# Patient Record
Sex: FEMALE | Race: WHITE | NOT HISPANIC OR LATINO | Employment: OTHER | RURAL
[De-identification: names, ages, dates, MRNs, and addresses within clinical notes are randomized per-mention and may not be internally consistent; named-entity substitution may affect disease eponyms.]

---

## 2020-08-19 ENCOUNTER — HISTORICAL (OUTPATIENT)
Dept: ADMINISTRATIVE | Facility: HOSPITAL | Age: 75
End: 2020-08-19

## 2020-10-13 ENCOUNTER — HISTORICAL (OUTPATIENT)
Dept: ADMINISTRATIVE | Facility: HOSPITAL | Age: 75
End: 2020-10-13

## 2021-03-06 DIAGNOSIS — Z12.31 SCREENING MAMMOGRAM FOR HIGH-RISK PATIENT: Primary | ICD-10-CM

## 2021-03-18 RX ORDER — PANTOPRAZOLE SODIUM 40 MG/1
40 TABLET, DELAYED RELEASE ORAL DAILY
COMMUNITY
Start: 2021-01-04 | End: 2021-11-08 | Stop reason: SDUPTHER

## 2021-03-18 RX ORDER — AMLODIPINE BESYLATE 10 MG/1
10 TABLET ORAL NIGHTLY
COMMUNITY
Start: 2020-12-23 | End: 2021-03-18 | Stop reason: SDUPTHER

## 2021-03-18 RX ORDER — PROPRANOLOL HYDROCHLORIDE 20 MG/1
20 TABLET ORAL 2 TIMES DAILY
COMMUNITY
Start: 2021-02-22 | End: 2021-05-24 | Stop reason: SDUPTHER

## 2021-03-18 RX ORDER — AMLODIPINE BESYLATE 10 MG/1
10 TABLET ORAL NIGHTLY
Qty: 90 TABLET | Refills: 3 | Status: SHIPPED | OUTPATIENT
Start: 2021-03-18 | End: 2022-04-04 | Stop reason: SDUPTHER

## 2021-03-18 RX ORDER — TRIAMTERENE/HYDROCHLOROTHIAZID 37.5-25 MG
1 TABLET ORAL DAILY PRN
COMMUNITY
End: 2021-04-20 | Stop reason: SDUPTHER

## 2021-04-20 RX ORDER — TRIAMTERENE/HYDROCHLOROTHIAZID 37.5-25 MG
1 TABLET ORAL DAILY PRN
Qty: 90 TABLET | Refills: 3 | Status: SHIPPED | OUTPATIENT
Start: 2021-04-20 | End: 2022-06-15 | Stop reason: SDUPTHER

## 2021-04-26 ENCOUNTER — OFFICE VISIT (OUTPATIENT)
Dept: FAMILY MEDICINE | Facility: CLINIC | Age: 76
End: 2021-04-26
Payer: MEDICARE

## 2021-04-26 VITALS
BODY MASS INDEX: 24.66 KG/M2 | DIASTOLIC BLOOD PRESSURE: 70 MMHG | OXYGEN SATURATION: 97 % | WEIGHT: 134 LBS | RESPIRATION RATE: 18 BRPM | TEMPERATURE: 97 F | HEART RATE: 68 BPM | HEIGHT: 62 IN | SYSTOLIC BLOOD PRESSURE: 132 MMHG

## 2021-04-26 DIAGNOSIS — M54.50 RIGHT-SIDED LOW BACK PAIN WITHOUT SCIATICA, UNSPECIFIED CHRONICITY: Primary | ICD-10-CM

## 2021-04-26 DIAGNOSIS — M62.830 BACK MUSCLE SPASM: ICD-10-CM

## 2021-04-26 DIAGNOSIS — Z78.0 OSTEOPENIA AFTER MENOPAUSE: ICD-10-CM

## 2021-04-26 DIAGNOSIS — N95.8 OTHER SPECIFIED MENOPAUSAL AND PERIMENOPAUSAL DISORDERS: ICD-10-CM

## 2021-04-26 DIAGNOSIS — M85.80 OSTEOPENIA AFTER MENOPAUSE: ICD-10-CM

## 2021-04-26 PROCEDURE — 99213 PR OFFICE/OUTPT VISIT, EST, LEVL III, 20-29 MIN: ICD-10-PCS | Mod: 25,,, | Performed by: FAMILY MEDICINE

## 2021-04-26 PROCEDURE — 96372 THER/PROPH/DIAG INJ SC/IM: CPT | Mod: ,,, | Performed by: FAMILY MEDICINE

## 2021-04-26 PROCEDURE — 96372 PR INJECTION,THERAP/PROPH/DIAG2ST, IM OR SUBCUT: ICD-10-PCS | Mod: ,,, | Performed by: FAMILY MEDICINE

## 2021-04-26 PROCEDURE — 99213 OFFICE O/P EST LOW 20 MIN: CPT | Mod: 25,,, | Performed by: FAMILY MEDICINE

## 2021-04-26 RX ORDER — VIT C/E/ZN/COPPR/LUTEIN/ZEAXAN 250MG-90MG
CAPSULE ORAL
COMMUNITY

## 2021-04-26 RX ORDER — METHYLPREDNISOLONE ACETATE 80 MG/ML
80 INJECTION, SUSPENSION INTRA-ARTICULAR; INTRALESIONAL; INTRAMUSCULAR; SOFT TISSUE ONCE
Status: COMPLETED | OUTPATIENT
Start: 2021-04-26 | End: 2021-04-26

## 2021-04-26 RX ORDER — CYCLOBENZAPRINE HCL 10 MG
10 TABLET ORAL 3 TIMES DAILY PRN
Qty: 270 TABLET | Refills: 3 | Status: SHIPPED | OUTPATIENT
Start: 2021-04-26 | End: 2021-05-06

## 2021-04-26 RX ORDER — CHOLECALCIFEROL (VITAMIN D3) 125 MCG
5000 CAPSULE ORAL DAILY
COMMUNITY

## 2021-04-26 RX ORDER — B-COMPLEX WITH VITAMIN C
1 TABLET ORAL DAILY
COMMUNITY

## 2021-04-26 RX ADMIN — METHYLPREDNISOLONE ACETATE 80 MG: 80 INJECTION, SUSPENSION INTRA-ARTICULAR; INTRALESIONAL; INTRAMUSCULAR; SOFT TISSUE at 09:04

## 2021-05-10 ENCOUNTER — HOSPITAL ENCOUNTER (OUTPATIENT)
Dept: RADIOLOGY | Facility: HOSPITAL | Age: 76
Discharge: HOME | End: 2021-05-10
Attending: FAMILY MEDICINE
Payer: MEDICARE

## 2021-05-10 DIAGNOSIS — M85.80 OSTEOPENIA AFTER MENOPAUSE: ICD-10-CM

## 2021-05-10 DIAGNOSIS — N95.8 OTHER SPECIFIED MENOPAUSAL AND PERIMENOPAUSAL DISORDERS: ICD-10-CM

## 2021-05-10 DIAGNOSIS — Z78.0 OSTEOPENIA AFTER MENOPAUSE: ICD-10-CM

## 2021-05-10 PROCEDURE — 77080 DXA BONE DENSITY AXIAL: CPT | Mod: 26,,, | Performed by: RADIOLOGY

## 2021-05-10 PROCEDURE — 77080 DXA BONE DENSITY AXIAL: CPT | Mod: TC

## 2021-05-10 PROCEDURE — 77080 DEXA BONE DENSITY SPINE HIP: ICD-10-PCS | Mod: 26,,, | Performed by: RADIOLOGY

## 2021-05-24 DIAGNOSIS — R25.1 TREMOR: Primary | ICD-10-CM

## 2021-05-24 RX ORDER — PROPRANOLOL HYDROCHLORIDE 20 MG/1
20 TABLET ORAL 2 TIMES DAILY
Qty: 60 TABLET | Refills: 5 | Status: SHIPPED | OUTPATIENT
Start: 2021-05-24 | End: 2021-05-25 | Stop reason: SDUPTHER

## 2021-05-25 DIAGNOSIS — R25.1 TREMOR: ICD-10-CM

## 2021-05-25 RX ORDER — PROPRANOLOL HYDROCHLORIDE 20 MG/1
20 TABLET ORAL 2 TIMES DAILY
Qty: 180 TABLET | Refills: 2 | Status: SHIPPED | OUTPATIENT
Start: 2021-05-25 | End: 2021-11-03

## 2021-07-20 ENCOUNTER — OFFICE VISIT (OUTPATIENT)
Dept: GASTROENTEROLOGY | Facility: CLINIC | Age: 76
End: 2021-07-20
Payer: MEDICARE

## 2021-07-20 VITALS
SYSTOLIC BLOOD PRESSURE: 128 MMHG | WEIGHT: 132 LBS | HEIGHT: 61 IN | DIASTOLIC BLOOD PRESSURE: 67 MMHG | OXYGEN SATURATION: 97 % | BODY MASS INDEX: 24.92 KG/M2 | HEART RATE: 79 BPM

## 2021-07-20 DIAGNOSIS — R11.2 NAUSEA AND VOMITING, INTRACTABILITY OF VOMITING NOT SPECIFIED, UNSPECIFIED VOMITING TYPE: ICD-10-CM

## 2021-07-20 DIAGNOSIS — Z11.59 SPECIAL SCREENING EXAMINATION FOR VIRAL DISEASE: Primary | ICD-10-CM

## 2021-07-20 DIAGNOSIS — R10.13 EPIGASTRIC DISCOMFORT: Primary | ICD-10-CM

## 2021-07-20 DIAGNOSIS — R19.7 DIARRHEA, UNSPECIFIED TYPE: ICD-10-CM

## 2021-07-20 PROCEDURE — 99214 PR OFFICE/OUTPT VISIT, EST, LEVL IV, 30-39 MIN: ICD-10-PCS | Mod: ,,, | Performed by: NURSE PRACTITIONER

## 2021-07-20 PROCEDURE — 99214 OFFICE O/P EST MOD 30 MIN: CPT | Mod: ,,, | Performed by: NURSE PRACTITIONER

## 2021-07-22 ENCOUNTER — TELEPHONE (OUTPATIENT)
Dept: GASTROENTEROLOGY | Facility: CLINIC | Age: 76
End: 2021-07-22

## 2021-08-02 ENCOUNTER — TELEPHONE (OUTPATIENT)
Dept: GASTROENTEROLOGY | Facility: CLINIC | Age: 76
End: 2021-08-02

## 2021-08-12 ENCOUNTER — OFFICE VISIT (OUTPATIENT)
Dept: FAMILY MEDICINE | Facility: CLINIC | Age: 76
End: 2021-08-12
Payer: MEDICARE

## 2021-08-12 VITALS
TEMPERATURE: 98 F | HEART RATE: 78 BPM | OXYGEN SATURATION: 98 % | RESPIRATION RATE: 18 BRPM | WEIGHT: 130 LBS | SYSTOLIC BLOOD PRESSURE: 134 MMHG | DIASTOLIC BLOOD PRESSURE: 74 MMHG | BODY MASS INDEX: 24.55 KG/M2 | HEIGHT: 61 IN

## 2021-08-12 DIAGNOSIS — M54.41 CHRONIC MIDLINE LOW BACK PAIN WITH RIGHT-SIDED SCIATICA: Primary | ICD-10-CM

## 2021-08-12 DIAGNOSIS — D72.829 LEUKOCYTOSIS, UNSPECIFIED TYPE: ICD-10-CM

## 2021-08-12 DIAGNOSIS — G89.29 CHRONIC MIDLINE LOW BACK PAIN WITH RIGHT-SIDED SCIATICA: Primary | ICD-10-CM

## 2021-08-12 DIAGNOSIS — R35.0 FREQUENT URINATION: ICD-10-CM

## 2021-08-12 DIAGNOSIS — M54.9 DORSALGIA, UNSPECIFIED: ICD-10-CM

## 2021-08-12 DIAGNOSIS — N32.9 BLADDER PROBLEM: ICD-10-CM

## 2021-08-12 LAB
BILIRUB UR QL STRIP: NEGATIVE
CLARITY UR: ABNORMAL
COLOR UR: YELLOW
GLUCOSE UR STRIP-MCNC: NEGATIVE MG/DL
KETONES UR STRIP-SCNC: NEGATIVE MG/DL
LEUKOCYTE ESTERASE UR QL STRIP: NEGATIVE
NITRITE UR QL STRIP: NEGATIVE
PH UR STRIP: 5.5 PH UNITS
PROT UR QL STRIP: NEGATIVE
RBC # UR STRIP: NEGATIVE /UL
SP GR UR STRIP: 1.02
UROBILINOGEN UR STRIP-ACNC: 0.2 MG/DL

## 2021-08-12 PROCEDURE — 87186 SC STD MICRODIL/AGAR DIL: CPT | Mod: ,,, | Performed by: CLINICAL MEDICAL LABORATORY

## 2021-08-12 PROCEDURE — 87086 CULTURE, URINE: ICD-10-PCS | Mod: ,,, | Performed by: CLINICAL MEDICAL LABORATORY

## 2021-08-12 PROCEDURE — 87077 CULTURE, URINE: ICD-10-PCS | Mod: ,,, | Performed by: CLINICAL MEDICAL LABORATORY

## 2021-08-12 PROCEDURE — 87077 CULTURE AEROBIC IDENTIFY: CPT | Mod: ,,, | Performed by: CLINICAL MEDICAL LABORATORY

## 2021-08-12 PROCEDURE — 81003 URINALYSIS AUTO W/O SCOPE: CPT | Mod: QW,,, | Performed by: CLINICAL MEDICAL LABORATORY

## 2021-08-12 PROCEDURE — 99214 OFFICE O/P EST MOD 30 MIN: CPT | Mod: ,,, | Performed by: FAMILY MEDICINE

## 2021-08-12 PROCEDURE — 99214 PR OFFICE/OUTPT VISIT, EST, LEVL IV, 30-39 MIN: ICD-10-PCS | Mod: ,,, | Performed by: FAMILY MEDICINE

## 2021-08-12 PROCEDURE — 87086 URINE CULTURE/COLONY COUNT: CPT | Mod: ,,, | Performed by: CLINICAL MEDICAL LABORATORY

## 2021-08-12 PROCEDURE — 87186 CULTURE, URINE: ICD-10-PCS | Mod: ,,, | Performed by: CLINICAL MEDICAL LABORATORY

## 2021-08-12 PROCEDURE — 81003 URINALYSIS: ICD-10-PCS | Mod: QW,,, | Performed by: CLINICAL MEDICAL LABORATORY

## 2021-08-14 LAB — UA COMPLETE W REFLEX CULTURE PNL UR: ABNORMAL

## 2021-08-16 DIAGNOSIS — N30.00 ACUTE CYSTITIS WITHOUT HEMATURIA: Primary | ICD-10-CM

## 2021-08-16 RX ORDER — CIPROFLOXACIN 250 MG/1
250 TABLET, FILM COATED ORAL 2 TIMES DAILY
Qty: 14 TABLET | Refills: 0 | Status: SHIPPED | OUTPATIENT
Start: 2021-08-16 | End: 2021-08-23

## 2021-08-18 ENCOUNTER — HOSPITAL ENCOUNTER (OUTPATIENT)
Dept: RADIOLOGY | Facility: HOSPITAL | Age: 76
Discharge: HOME | End: 2021-08-18
Attending: FAMILY MEDICINE
Payer: MEDICARE

## 2021-08-18 DIAGNOSIS — G89.29 CHRONIC MIDLINE LOW BACK PAIN WITH RIGHT-SIDED SCIATICA: ICD-10-CM

## 2021-08-18 DIAGNOSIS — M54.9 DORSALGIA, UNSPECIFIED: ICD-10-CM

## 2021-08-18 DIAGNOSIS — M54.41 CHRONIC MIDLINE LOW BACK PAIN WITH RIGHT-SIDED SCIATICA: ICD-10-CM

## 2021-08-18 PROCEDURE — 72148 MRI LUMBAR SPINE W/O DYE: CPT | Mod: 26,,, | Performed by: STUDENT IN AN ORGANIZED HEALTH CARE EDUCATION/TRAINING PROGRAM

## 2021-08-18 PROCEDURE — 72148 MRI LUMBAR SPINE WITHOUT CONTRAST: ICD-10-PCS | Mod: 26,,, | Performed by: STUDENT IN AN ORGANIZED HEALTH CARE EDUCATION/TRAINING PROGRAM

## 2021-08-18 PROCEDURE — 72148 MRI LUMBAR SPINE W/O DYE: CPT | Mod: TC

## 2021-08-19 ENCOUNTER — HOSPITAL ENCOUNTER (OUTPATIENT)
Dept: RADIOLOGY | Facility: HOSPITAL | Age: 76
Discharge: HOME | End: 2021-08-19
Attending: NURSE PRACTITIONER
Payer: MEDICARE

## 2021-08-19 ENCOUNTER — OFFICE VISIT (OUTPATIENT)
Dept: SPINE | Facility: CLINIC | Age: 76
End: 2021-08-19
Payer: MEDICARE

## 2021-08-19 VITALS — HEIGHT: 61 IN | BODY MASS INDEX: 24.73 KG/M2 | WEIGHT: 131 LBS

## 2021-08-19 DIAGNOSIS — M54.41 CHRONIC MIDLINE LOW BACK PAIN WITH RIGHT-SIDED SCIATICA: ICD-10-CM

## 2021-08-19 DIAGNOSIS — G89.29 CHRONIC MIDLINE LOW BACK PAIN WITH RIGHT-SIDED SCIATICA: ICD-10-CM

## 2021-08-19 DIAGNOSIS — M43.16 SPONDYLOLISTHESIS OF LUMBAR REGION: Primary | ICD-10-CM

## 2021-08-19 DIAGNOSIS — M54.9 DORSALGIA, UNSPECIFIED: ICD-10-CM

## 2021-08-19 PROCEDURE — 72110 XR LUMBAR SPINE 5 VIEW WITH FLEX AND EXT: ICD-10-PCS | Mod: 26,,, | Performed by: RADIOLOGY

## 2021-08-19 PROCEDURE — 99214 PR OFFICE/OUTPT VISIT, EST, LEVL IV, 30-39 MIN: ICD-10-PCS | Mod: S$PBB,ICN,, | Performed by: NURSE PRACTITIONER

## 2021-08-19 PROCEDURE — 72110 X-RAY EXAM L-2 SPINE 4/>VWS: CPT | Mod: 26,,, | Performed by: RADIOLOGY

## 2021-08-19 PROCEDURE — 72110 X-RAY EXAM L-2 SPINE 4/>VWS: CPT | Mod: TC

## 2021-08-19 PROCEDURE — 99214 OFFICE O/P EST MOD 30 MIN: CPT | Mod: S$PBB,ICN,, | Performed by: NURSE PRACTITIONER

## 2021-08-19 PROCEDURE — 99214 OFFICE O/P EST MOD 30 MIN: CPT | Mod: PBBFAC | Performed by: NURSE PRACTITIONER

## 2021-08-19 RX ORDER — GABAPENTIN 300 MG/1
300 CAPSULE ORAL 3 TIMES DAILY
Qty: 90 CAPSULE | Refills: 11 | Status: SHIPPED | OUTPATIENT
Start: 2021-08-19 | End: 2022-12-01

## 2021-08-24 ENCOUNTER — TELEPHONE (OUTPATIENT)
Dept: FAMILY MEDICINE | Facility: CLINIC | Age: 76
End: 2021-08-24

## 2021-08-24 DIAGNOSIS — N30.00 ACUTE CYSTITIS WITHOUT HEMATURIA: Primary | ICD-10-CM

## 2021-08-24 RX ORDER — CEFDINIR 300 MG/1
300 CAPSULE ORAL 2 TIMES DAILY
Qty: 14 CAPSULE | Refills: 0 | Status: SHIPPED | OUTPATIENT
Start: 2021-08-24 | End: 2021-08-31

## 2021-09-01 ENCOUNTER — ANESTHESIA EVENT (OUTPATIENT)
Dept: GASTROENTEROLOGY | Facility: HOSPITAL | Age: 76
End: 2021-09-01
Payer: MEDICARE

## 2021-09-01 ENCOUNTER — HOSPITAL ENCOUNTER (OUTPATIENT)
Dept: GASTROENTEROLOGY | Facility: HOSPITAL | Age: 76
Discharge: HOME | End: 2021-09-01
Attending: NURSE PRACTITIONER
Payer: MEDICARE

## 2021-09-01 ENCOUNTER — ANESTHESIA (OUTPATIENT)
Dept: GASTROENTEROLOGY | Facility: HOSPITAL | Age: 76
End: 2021-09-01
Payer: MEDICARE

## 2021-09-01 VITALS
DIASTOLIC BLOOD PRESSURE: 60 MMHG | TEMPERATURE: 97 F | WEIGHT: 132 LBS | SYSTOLIC BLOOD PRESSURE: 147 MMHG | BODY MASS INDEX: 24.92 KG/M2 | RESPIRATION RATE: 13 BRPM | OXYGEN SATURATION: 99 % | HEIGHT: 61 IN | HEART RATE: 76 BPM

## 2021-09-01 DIAGNOSIS — R11.2 NAUSEA AND VOMITING, INTRACTABILITY OF VOMITING NOT SPECIFIED, UNSPECIFIED VOMITING TYPE: ICD-10-CM

## 2021-09-01 DIAGNOSIS — K44.9 HH (HIATUS HERNIA): ICD-10-CM

## 2021-09-01 DIAGNOSIS — K29.00 ACUTE SUPERFICIAL GASTRITIS WITHOUT HEMORRHAGE: ICD-10-CM

## 2021-09-01 PROCEDURE — 88305 SURGICAL PATHOLOGY: ICD-10-PCS | Mod: 26,,, | Performed by: PATHOLOGY

## 2021-09-01 PROCEDURE — 27201423 OPTIME MED/SURG SUP & DEVICES STERILE SUPPLY

## 2021-09-01 PROCEDURE — 63600175 PHARM REV CODE 636 W HCPCS: Performed by: NURSE ANESTHETIST, CERTIFIED REGISTERED

## 2021-09-01 PROCEDURE — 88342 IMHCHEM/IMCYTCHM 1ST ANTB: CPT | Mod: 26,,, | Performed by: PATHOLOGY

## 2021-09-01 PROCEDURE — 43239 EGD BIOPSY SINGLE/MULTIPLE: CPT

## 2021-09-01 PROCEDURE — C1889 IMPLANT/INSERT DEVICE, NOC: HCPCS

## 2021-09-01 PROCEDURE — 88305 TISSUE EXAM BY PATHOLOGIST: CPT | Mod: 26,,, | Performed by: PATHOLOGY

## 2021-09-01 PROCEDURE — 37000008 HC ANESTHESIA 1ST 15 MINUTES

## 2021-09-01 PROCEDURE — 88342 SURGICAL PATHOLOGY: ICD-10-PCS | Mod: 26,,, | Performed by: PATHOLOGY

## 2021-09-01 PROCEDURE — D9220A PRA ANESTHESIA: ICD-10-PCS | Mod: ,,, | Performed by: NURSE ANESTHETIST, CERTIFIED REGISTERED

## 2021-09-01 PROCEDURE — 88305 TISSUE EXAM BY PATHOLOGIST: CPT | Mod: SUR | Performed by: INTERNAL MEDICINE

## 2021-09-01 PROCEDURE — D9220A PRA ANESTHESIA: Mod: ,,, | Performed by: NURSE ANESTHETIST, CERTIFIED REGISTERED

## 2021-09-01 PROCEDURE — 25000003 PHARM REV CODE 250: Performed by: NURSE ANESTHETIST, CERTIFIED REGISTERED

## 2021-09-01 RX ORDER — PROPOFOL 10 MG/ML
INJECTION, EMULSION INTRAVENOUS
Status: DISCONTINUED | OUTPATIENT
Start: 2021-09-01 | End: 2021-09-01

## 2021-09-01 RX ORDER — SODIUM CHLORIDE 0.9 % (FLUSH) 0.9 %
10 SYRINGE (ML) INJECTION
Status: DISCONTINUED | OUTPATIENT
Start: 2021-09-01 | End: 2021-09-02 | Stop reason: HOSPADM

## 2021-09-01 RX ORDER — LIDOCAINE HYDROCHLORIDE 20 MG/ML
INJECTION, SOLUTION EPIDURAL; INFILTRATION; INTRACAUDAL; PERINEURAL
Status: DISCONTINUED | OUTPATIENT
Start: 2021-09-01 | End: 2021-09-01

## 2021-09-01 RX ADMIN — SODIUM CHLORIDE: 9 INJECTION, SOLUTION INTRAVENOUS at 09:09

## 2021-09-01 RX ADMIN — PROPOFOL 80 MG: 10 INJECTION, EMULSION INTRAVENOUS at 09:09

## 2021-09-01 RX ADMIN — LIDOCAINE HYDROCHLORIDE 60 MG: 20 INJECTION, SOLUTION INTRAVENOUS at 09:09

## 2021-09-01 RX ADMIN — PROPOFOL 30 MG: 10 INJECTION, EMULSION INTRAVENOUS at 09:09

## 2021-09-02 LAB
DHEA SERPL-MCNC: NORMAL
ESTROGEN SERPL-MCNC: NORMAL PG/ML
LAB AP GROSS DESCRIPTION: NORMAL
LAB AP LABORATORY NOTES: NORMAL
T3RU NFR SERPL: NORMAL %

## 2021-09-14 ENCOUNTER — TELEPHONE (OUTPATIENT)
Dept: GASTROENTEROLOGY | Facility: CLINIC | Age: 76
End: 2021-09-14

## 2021-09-15 ENCOUNTER — OFFICE VISIT (OUTPATIENT)
Dept: GASTROENTEROLOGY | Facility: CLINIC | Age: 76
End: 2021-09-15
Payer: MEDICARE

## 2021-09-15 VITALS
DIASTOLIC BLOOD PRESSURE: 68 MMHG | HEIGHT: 61 IN | SYSTOLIC BLOOD PRESSURE: 140 MMHG | BODY MASS INDEX: 25.49 KG/M2 | HEART RATE: 86 BPM | WEIGHT: 135 LBS | OXYGEN SATURATION: 98 %

## 2021-09-15 DIAGNOSIS — R10.13 EPIGASTRIC DISCOMFORT: ICD-10-CM

## 2021-09-15 DIAGNOSIS — R11.2 NAUSEA AND VOMITING, INTRACTABILITY OF VOMITING NOT SPECIFIED, UNSPECIFIED VOMITING TYPE: Primary | ICD-10-CM

## 2021-09-15 DIAGNOSIS — R19.7 DIARRHEA, UNSPECIFIED TYPE: ICD-10-CM

## 2021-09-15 DIAGNOSIS — K44.9 HH (HIATUS HERNIA): ICD-10-CM

## 2021-09-15 PROCEDURE — 99214 PR OFFICE/OUTPT VISIT, EST, LEVL IV, 30-39 MIN: ICD-10-PCS | Mod: ,,, | Performed by: NURSE PRACTITIONER

## 2021-09-15 PROCEDURE — 99214 OFFICE O/P EST MOD 30 MIN: CPT | Mod: ,,, | Performed by: NURSE PRACTITIONER

## 2021-09-20 ENCOUNTER — HOSPITAL ENCOUNTER (OUTPATIENT)
Dept: RADIOLOGY | Facility: HOSPITAL | Age: 76
Discharge: HOME | End: 2021-09-20
Attending: NURSE PRACTITIONER
Payer: MEDICARE

## 2021-09-20 ENCOUNTER — TELEPHONE (OUTPATIENT)
Dept: GASTROENTEROLOGY | Facility: CLINIC | Age: 76
End: 2021-09-20

## 2021-09-20 DIAGNOSIS — R11.2 NAUSEA AND VOMITING, INTRACTABILITY OF VOMITING NOT SPECIFIED, UNSPECIFIED VOMITING TYPE: ICD-10-CM

## 2021-09-20 PROCEDURE — 78264 GASTRIC EMPTYING IMG STUDY: CPT | Mod: TC

## 2021-09-20 PROCEDURE — 78264 NM GASTRIC EMPTYING: ICD-10-PCS | Mod: 26,,, | Performed by: RADIOLOGY

## 2021-09-20 PROCEDURE — 78264 GASTRIC EMPTYING IMG STUDY: CPT | Mod: 26,,, | Performed by: RADIOLOGY

## 2021-09-21 ENCOUNTER — TELEPHONE (OUTPATIENT)
Dept: GASTROENTEROLOGY | Facility: CLINIC | Age: 76
End: 2021-09-21

## 2021-11-01 ENCOUNTER — OFFICE VISIT (OUTPATIENT)
Dept: SPINE | Facility: CLINIC | Age: 76
End: 2021-11-01
Payer: MEDICARE

## 2021-11-01 DIAGNOSIS — M43.16 SPONDYLOLISTHESIS OF LUMBAR REGION: Primary | ICD-10-CM

## 2021-11-01 DIAGNOSIS — M54.16 LUMBAR RADICULOPATHY: ICD-10-CM

## 2021-11-01 PROCEDURE — 99213 OFFICE O/P EST LOW 20 MIN: CPT | Mod: PBBFAC | Performed by: ORTHOPAEDIC SURGERY

## 2021-11-01 PROCEDURE — 99214 OFFICE O/P EST MOD 30 MIN: CPT | Mod: S$PBB,,, | Performed by: ORTHOPAEDIC SURGERY

## 2021-11-01 PROCEDURE — 99214 PR OFFICE/OUTPT VISIT, EST, LEVL IV, 30-39 MIN: ICD-10-PCS | Mod: S$PBB,,, | Performed by: ORTHOPAEDIC SURGERY

## 2021-11-03 ENCOUNTER — OFFICE VISIT (OUTPATIENT)
Dept: NEUROLOGY | Facility: CLINIC | Age: 76
End: 2021-11-03
Payer: MEDICARE

## 2021-11-03 VITALS
HEART RATE: 103 BPM | SYSTOLIC BLOOD PRESSURE: 136 MMHG | OXYGEN SATURATION: 99 % | DIASTOLIC BLOOD PRESSURE: 68 MMHG | WEIGHT: 135 LBS | RESPIRATION RATE: 18 BRPM | HEIGHT: 61 IN | BODY MASS INDEX: 25.49 KG/M2

## 2021-11-03 DIAGNOSIS — G25.0 BENIGN HEAD TREMOR: Primary | ICD-10-CM

## 2021-11-03 PROCEDURE — 99213 PR OFFICE/OUTPT VISIT, EST, LEVL III, 20-29 MIN: ICD-10-PCS | Mod: S$PBB,,, | Performed by: NURSE PRACTITIONER

## 2021-11-03 PROCEDURE — 99214 OFFICE O/P EST MOD 30 MIN: CPT | Mod: PBBFAC | Performed by: NURSE PRACTITIONER

## 2021-11-03 PROCEDURE — 99213 OFFICE O/P EST LOW 20 MIN: CPT | Mod: S$PBB,,, | Performed by: NURSE PRACTITIONER

## 2021-11-03 RX ORDER — PROPRANOLOL HYDROCHLORIDE 40 MG/1
40 TABLET ORAL 2 TIMES DAILY
Qty: 60 TABLET | Refills: 11 | Status: SHIPPED | OUTPATIENT
Start: 2021-11-03 | End: 2022-12-01 | Stop reason: SDUPTHER

## 2021-11-03 RX ORDER — PROPRANOLOL HYDROCHLORIDE 40 MG/1
40 TABLET ORAL 3 TIMES DAILY
Qty: 90 TABLET | Refills: 11 | Status: SHIPPED | OUTPATIENT
Start: 2021-11-03 | End: 2021-11-03

## 2021-11-08 ENCOUNTER — HOSPITAL ENCOUNTER (OUTPATIENT)
Dept: RADIOLOGY | Facility: HOSPITAL | Age: 76
Discharge: HOME | End: 2021-11-08
Payer: MEDICARE

## 2021-11-08 VITALS — HEIGHT: 61 IN | BODY MASS INDEX: 25.49 KG/M2 | WEIGHT: 135 LBS

## 2021-11-08 DIAGNOSIS — Z12.31 SCREENING MAMMOGRAM FOR HIGH-RISK PATIENT: ICD-10-CM

## 2021-11-08 PROCEDURE — 77067 SCR MAMMO BI INCL CAD: CPT | Mod: TC

## 2021-11-08 RX ORDER — PANTOPRAZOLE SODIUM 40 MG/1
40 TABLET, DELAYED RELEASE ORAL DAILY
Qty: 30 TABLET | Refills: 6 | Status: SHIPPED | OUTPATIENT
Start: 2021-11-08 | End: 2022-07-05 | Stop reason: SDUPTHER

## 2021-11-10 ENCOUNTER — OFFICE VISIT (OUTPATIENT)
Dept: GASTROENTEROLOGY | Facility: CLINIC | Age: 76
End: 2021-11-10
Payer: MEDICARE

## 2021-11-10 VITALS
WEIGHT: 134 LBS | SYSTOLIC BLOOD PRESSURE: 139 MMHG | HEART RATE: 75 BPM | BODY MASS INDEX: 25.3 KG/M2 | OXYGEN SATURATION: 99 % | DIASTOLIC BLOOD PRESSURE: 67 MMHG | HEIGHT: 61 IN

## 2021-11-10 DIAGNOSIS — K31.84 GASTROPARESIS: ICD-10-CM

## 2021-11-10 DIAGNOSIS — K44.9 HH (HIATUS HERNIA): ICD-10-CM

## 2021-11-10 DIAGNOSIS — R10.13 EPIGASTRIC DISCOMFORT: Primary | ICD-10-CM

## 2021-11-10 PROCEDURE — 99213 OFFICE O/P EST LOW 20 MIN: CPT | Mod: ,,, | Performed by: NURSE PRACTITIONER

## 2021-11-10 PROCEDURE — 99213 PR OFFICE/OUTPT VISIT, EST, LEVL III, 20-29 MIN: ICD-10-PCS | Mod: ,,, | Performed by: NURSE PRACTITIONER

## 2022-01-30 ENCOUNTER — OFFICE VISIT (OUTPATIENT)
Dept: FAMILY MEDICINE | Facility: CLINIC | Age: 77
End: 2022-01-30
Payer: MEDICARE

## 2022-01-30 VITALS
HEART RATE: 90 BPM | OXYGEN SATURATION: 99 % | BODY MASS INDEX: 25.3 KG/M2 | DIASTOLIC BLOOD PRESSURE: 86 MMHG | TEMPERATURE: 98 F | WEIGHT: 134 LBS | HEIGHT: 61 IN | SYSTOLIC BLOOD PRESSURE: 155 MMHG

## 2022-01-30 DIAGNOSIS — Z20.828 CONTACT WITH AND (SUSPECTED) EXPOSURE TO OTHER VIRAL COMMUNICABLE DISEASES: Primary | ICD-10-CM

## 2022-01-30 DIAGNOSIS — U07.1 CLINICAL DIAGNOSIS OF COVID-19: ICD-10-CM

## 2022-01-30 LAB
CTP QC/QA: YES
SARS-COV-2 AG RESP QL IA.RAPID: POSITIVE

## 2022-01-30 PROCEDURE — 87426 SARSCOV CORONAVIRUS AG IA: CPT | Mod: RHCUB | Performed by: NURSE PRACTITIONER

## 2022-01-30 PROCEDURE — 99213 PR OFFICE/OUTPT VISIT, EST, LEVL III, 20-29 MIN: ICD-10-PCS | Mod: CR,,, | Performed by: NURSE PRACTITIONER

## 2022-01-30 PROCEDURE — 99213 OFFICE O/P EST LOW 20 MIN: CPT | Mod: CR,,, | Performed by: NURSE PRACTITIONER

## 2022-01-30 RX ORDER — ONDANSETRON 4 MG/1
4 TABLET, ORALLY DISINTEGRATING ORAL EVERY 8 HOURS PRN
Qty: 30 TABLET | Refills: 0 | Status: SHIPPED | OUTPATIENT
Start: 2022-01-30 | End: 2022-02-07 | Stop reason: SDUPTHER

## 2022-01-30 NOTE — PROGRESS NOTES
RAYMUNDO Jiménez   Lefor BENY SWEENEY [address] [place] [address]  [phone]      PATIENT NAME: Masoud Major  : 1945  DATE: 22  MRN: [id number]      Billing Provider: RAYMUNDO Jiménez  Level of Service: MT OFFICE/OUTPT VISIT, EST, LEVL III, 20-29 MIN  Patient PCP Information     Provider PCP Type    Charley Holm MD General          Reason for Visit / Chief Complaint: COVID-19 Concerns, Cough, Sore Throat, and Nausea       Update PCP  Update Chief Complaint         History of Present Illness / Problem Focused Workflow     Patient presents to clinic with the above listed complaints. She is COVID positive today. States her  tested positive 4 and received IV antibodies yesterday. She declined IV antibodies at this time and states she will chantale back if she changes her mind.     Review of Systems     Review of Systems   Constitutional: Negative for chills, diaphoresis, fatigue and fever.   HENT: Positive for sore throat. Negative for congestion, ear pain, facial swelling and trouble swallowing.    Eyes: Negative for pain, discharge, redness, itching and visual disturbance.   Respiratory: Positive for cough. Negative for apnea, chest tightness, shortness of breath and wheezing.    Cardiovascular: Negative for chest pain, palpitations and leg swelling.   Gastrointestinal: Positive for nausea. Negative for abdominal pain, constipation, diarrhea and vomiting.   Genitourinary: Negative for dysuria.   Skin: Negative for rash.   Neurological: Negative for dizziness and headaches.       Medical / Social / Family History     Past Medical History:   Diagnosis Date    Abnormal weight loss     Acute bronchitis     Acute superficial gastritis without hemorrhage 2021    Altered bowel function     Anxiety     Back pain     Belching symptom     Benign hypertension     Cobalamin deficiency     Constipation      Contact dermatitis     Depression     Diarrhea     Disorder of muscle, ligament, and fascia     Dysphagia     Elevated white blood cell count     Encounter for screening colonoscopy 03/22/2016    Essential hypertension     Essential tremor     Excessive weight loss     Fracture of thoracic spine     Frequent urination     Ganglion, right wrist     GERD (gastroesophageal reflux disease)     HH (hiatus hernia) 9/1/2021    History of bone density study 10/26/2020    History of esophagogastroduodenoscopy (EGD) 06/23/2016    History of mammography, screening 03/29/2017    Hyperlipemia     Incomplete emptying of bladder     Insomnia     Multiple joint pain     Osteoarthritis     Osteopenia     Pain, lumbar region     Pap smear for cervical cancer screening 03/2010    Scoliosis     Stomach cramps     Subconjunctival hemorrhage     Vitamin D deficiency        Past Surgical History:   Procedure Laterality Date    ADENOIDECTOMY      BREAST BIOPSY      CERVIX LESION DESTRUCTION      CHOLECYSTECTOMY      HYSTERECTOMY      suspensiion of uterus  1965    TONSILLECTOMY      VERTEBROPLASTY  06/06/2013    Dr. alejandro       Social History  Ms.  reports that she has quit smoking. She has never used smokeless tobacco. She reports current alcohol use of about 1.0 standard drink of alcohol per week. She reports that she does not use drugs.    Family History  Ms.'s family history includes Breast cancer in her maternal aunt, maternal aunt, and sister; Cancer in her sister; Dementia in her mother; Diabetes in her mother; Heart disease in her mother; Hypertension in her mother.    Medications and Allergies     Medications  Outpatient Medications Marked as Taking for the 1/30/22 encounter (Office Visit) with RAYMUNDO Jiménez   Medication Sig Dispense Refill    B-complex with vitamin C (Z-BEC OR EQUIV) tablet Take 1 tablet by mouth once daily.      cholecalciferol, vitamin D3, 125 mcg (5,000 unit)  capsule Take 5,000 Units by mouth once daily.      gabapentin (NEURONTIN) 300 MG capsule Take 1 capsule (300 mg total) by mouth 3 (three) times daily. (Patient taking differently: Take 100 mg by mouth 3 (three) times daily.) 90 capsule 11    pantoprazole (PROTONIX) 40 MG tablet Take 1 tablet (40 mg total) by mouth once daily. 30 tablet 6    propranoloL (INDERAL) 40 MG tablet Take 1 tablet (40 mg total) by mouth 2 (two) times daily. 60 tablet 11    vit C,B-Cy-mrxrx-lutein-zeaxan (PRESERVISION AREDS-2) 250-90-40-1 mg Cap Take by mouth.         Allergies  Review of patient's allergies indicates:   Allergen Reactions    Augmentin [amoxicillin-pot clavulanate]     Macrobid [nitrofurantoin monohyd/m-cryst]     Sulfa (sulfonamide antibiotics)        Physical Examination   There were no vitals filed for this visit.  Physical Exam  Constitutional:       General: She is not in acute distress.     Appearance: She is ill-appearing.   HENT:      Head: Normocephalic.      Right Ear: External ear normal.      Left Ear: External ear normal.      Nose: Nose normal.   Eyes:      Conjunctiva/sclera: Conjunctivae normal.      Pupils: Pupils are equal, round, and reactive to light.   Cardiovascular:      Rate and Rhythm: Normal rate and regular rhythm.      Pulses: Normal pulses.      Heart sounds: Normal heart sounds.   Pulmonary:      Effort: Pulmonary effort is normal.      Breath sounds: Normal breath sounds.   Neurological:      Mental Status: She is alert.         Assessment and Plan (including Health Maintenance)      Problem List  Smart Sets  Document Outside HM   :    Health Maintenance Due   Topic Date Due    Hepatitis C Screening  Never done    COVID-19 Vaccine (1) Never done    Shingles Vaccine (1 of 2) Never done    Influenza Vaccine (1) 09/01/2021       Problem List Items Addressed This Visit    None     Visit Diagnoses     Contact with and (suspected) exposure to other viral communicable diseases    -  Primary     Relevant Orders    SARS Coronavirus 2 Antigen, POCT          Health Maintenance Topics with due status: Not Due       Topic Last Completion Date    TETANUS VACCINE 01/30/2015    Lipid Panel 07/30/2020    DEXA SCAN 05/10/2021    Colorectal Cancer Screening 07/23/2021    Mammogram 11/08/2021       Future Appointments   Date Time Provider Department Center   5/2/2022  8:45 AM Lalo Hay MD [facility]   5/10/2022  9:00 AM RAYMUNDO Umaña [facility]   11/14/2022  1:30 PM [facility]          Signature:  RAYMUNDO JiménezH BENY SWEENEY [facility] - [address] [place] [address]  [phone]    Date of encounter: 1/30/22

## 2022-02-07 ENCOUNTER — OFFICE VISIT (OUTPATIENT)
Dept: FAMILY MEDICINE | Facility: CLINIC | Age: 77
End: 2022-02-07
Payer: MEDICARE

## 2022-02-07 DIAGNOSIS — J01.40 ACUTE NON-RECURRENT PANSINUSITIS: ICD-10-CM

## 2022-02-07 DIAGNOSIS — U07.1 COVID-19: ICD-10-CM

## 2022-02-07 DIAGNOSIS — R11.2 NAUSEA AND VOMITING, INTRACTABILITY OF VOMITING NOT SPECIFIED, UNSPECIFIED VOMITING TYPE: Primary | ICD-10-CM

## 2022-02-07 DIAGNOSIS — J02.9 SORE THROAT: ICD-10-CM

## 2022-02-07 PROCEDURE — 99213 PR OFFICE/OUTPT VISIT, EST, LEVL III, 20-29 MIN: ICD-10-PCS | Mod: CR,,, | Performed by: FAMILY MEDICINE

## 2022-02-07 PROCEDURE — 99213 OFFICE O/P EST LOW 20 MIN: CPT | Mod: CR,,, | Performed by: FAMILY MEDICINE

## 2022-02-07 RX ORDER — AZITHROMYCIN 250 MG/1
TABLET, FILM COATED ORAL
Qty: 6 TABLET | Refills: 0 | Status: SHIPPED | OUTPATIENT
Start: 2022-02-07 | End: 2022-02-12

## 2022-02-07 RX ORDER — METHYLPREDNISOLONE 4 MG/1
TABLET ORAL
Qty: 1 EACH | Refills: 0 | Status: SHIPPED | OUTPATIENT
Start: 2022-02-07 | End: 2022-02-28

## 2022-02-07 RX ORDER — ONDANSETRON 4 MG/1
4 TABLET, ORALLY DISINTEGRATING ORAL EVERY 8 HOURS PRN
Qty: 30 TABLET | Refills: 0 | Status: SHIPPED | OUTPATIENT
Start: 2022-02-07 | End: 2022-03-09

## 2022-02-07 RX ORDER — IPRATROPIUM BROMIDE 42 UG/1
2 SPRAY, METERED NASAL 4 TIMES DAILY
Qty: 15 ML | Refills: 1 | Status: SHIPPED | OUTPATIENT
Start: 2022-02-07

## 2022-02-07 NOTE — ASSESSMENT & PLAN NOTE
Patient has a persistent positive COVID test so will go ahead and treat her with a Medrol Dosepak and  Zithromax for her sinus infection.  Will also give her some Atrovent nasal spray to open her sinuses.  Follow-up on a p.r.n. basis or in 1 week.  She will need to continue to isolate since she is COVID positive for at least 5 more days.

## 2022-02-07 NOTE — PROGRESS NOTES
Jero Dallas DO   [address]  [place]  [address]      PATIENT NAME: Masoud Major  : 1945  DATE: 22  MRN: [id number]      Billing Provider: Jero Dallas DO  Level of Service:   Patient PCP Information     Provider PCP Type    Charley Holm MD General          Reason for Visit / Chief Complaint: Sore Throat (Patient complains sore throat x 2 days), Nausea (Patient complains nausea on off x 1 day), and Sinus Problem (Patient complains of a lot of yellow sinus drainage x 2 days)       Update PCP  Update Chief Complaint         History of Present Illness / Problem Focused Workflow     Masoud Major presents to the clinic with Sore Throat (Patient complains sore throat x 2 days), Nausea (Patient complains nausea on off x 1 day), and Sinus Problem (Patient complains of a lot of yellow sinus drainage x 2 days)     Patient had a recent positive COVID test around the  of the is month and is still positive today.  She reports sinus pressure and pain with congestion in her chest.  She denies any diarrhea but does have some nausea with vomiting.  No fever today.  Does have yellow-greenish discharge from her nose.  No PND orthopnea or swelling of her legs.      Review of Systems     Review of Systems   Constitutional: Negative for activity change, appetite change, chills, fatigue and fever.   HENT: Positive for nasal congestion, postnasal drip and sinus pressure/congestion. Negative for ear discharge, ear pain, mouth dryness, mouth sores, sore throat and voice change.    Eyes: Negative for pain, discharge, redness, itching and visual disturbance.   Respiratory: Positive for cough. Negative for apnea, chest tightness, shortness of breath and wheezing.    Cardiovascular: Negative for chest pain, palpitations and leg swelling.   Gastrointestinal: Negative for abdominal distention, abdominal pain, anal bleeding, blood in stool, change in bowel habit, constipation,  diarrhea, nausea, vomiting, reflux and change in bowel habit.   Endocrine: Negative for cold intolerance, heat intolerance, polydipsia, polyphagia and polyuria.   Genitourinary: Negative for difficulty urinating, enuresis, erectile dysfunction, frequency, genital sores, hematuria, hot flashes, menstrual irregularity, urgency and vaginal dryness.   Musculoskeletal: Negative for arthralgias, back pain, gait problem, leg pain, myalgias and neck pain.   Integumentary:  Negative for rash, mole/lesion, breast mass, breast discharge and breast tenderness.   Allergic/Immunologic: Negative for environmental allergies and food allergies.   Neurological: Negative for dizziness, vertigo, tremors, seizures, syncope, facial asymmetry, speech difficulty, weakness, light-headedness, numbness, headaches, disturbances in coordination, memory loss and coordination difficulties.   Hematological: Negative for adenopathy. Does not bruise/bleed easily.   Psychiatric/Behavioral: Negative for agitation, behavioral problems, confusion, decreased concentration, dysphoric mood, hallucinations, self-injury, sleep disturbance and suicidal ideas. The patient is not nervous/anxious and is not hyperactive.    Breast: Negative for mass and tenderness      Medical / Social / Family History     Past Medical History:   Diagnosis Date    Abnormal weight loss     Acute bronchitis     Acute superficial gastritis without hemorrhage 9/1/2021    Altered bowel function     Anxiety     Back pain     Belching symptom     Benign hypertension     Cobalamin deficiency     Constipation     Contact dermatitis     Depression     Diarrhea     Disorder of muscle, ligament, and fascia     Dysphagia     Elevated white blood cell count     Encounter for screening colonoscopy 03/22/2016    Essential hypertension     Essential tremor     Excessive weight loss     Fracture of thoracic spine     Frequent urination     Ganglion, right wrist     GERD  (gastroesophageal reflux disease)     HH (hiatus hernia) 9/1/2021    History of bone density study 10/26/2020    History of esophagogastroduodenoscopy (EGD) 06/23/2016    History of mammography, screening 03/29/2017    Hyperlipemia     Incomplete emptying of bladder     Insomnia     Multiple joint pain     Osteoarthritis     Osteopenia     Pain, lumbar region     Pap smear for cervical cancer screening 03/2010    Scoliosis     Stomach cramps     Subconjunctival hemorrhage     Vitamin D deficiency        Past Surgical History:   Procedure Laterality Date    ADENOIDECTOMY      BREAST BIOPSY      CERVIX LESION DESTRUCTION      CHOLECYSTECTOMY      HYSTERECTOMY      suspensiion of uterus  1965    TONSILLECTOMY      VERTEBROPLASTY  06/06/2013    Dr. alejandro       Social History  Ms.  reports that she has quit smoking. She has never used smokeless tobacco. She reports current alcohol use of about 1.0 standard drink of alcohol per week. She reports that she does not use drugs.    Family History  Ms.'s family history includes Breast cancer in her maternal aunt, maternal aunt, and sister; Cancer in her sister; Dementia in her mother; Diabetes in her mother; Heart disease in her mother; Hypertension in her mother.    Medications and Allergies     Medications  Outpatient Medications Marked as Taking for the 2/7/22 encounter (Office Visit) with Jero Dallas, DO   Medication Sig Dispense Refill    amLODIPine (NORVASC) 10 MG tablet Take 1 tablet (10 mg total) by mouth every evening. 90 tablet 3    B-complex with vitamin C (Z-BEC OR EQUIV) tablet Take 1 tablet by mouth once daily.      cholecalciferol, vitamin D3, 125 mcg (5,000 unit) capsule Take 5,000 Units by mouth once daily.      pantoprazole (PROTONIX) 40 MG tablet Take 1 tablet (40 mg total) by mouth once daily. 30 tablet 6    propranoloL (INDERAL) 40 MG tablet Take 1 tablet (40 mg total) by mouth 2 (two) times daily. 60 tablet 11    vit  C,W-Hh-qmhxn-lutein-zeaxan (PRESERVISION AREDS-2) 250-90-40-1 mg Cap Take by mouth.      [DISCONTINUED] ondansetron (ZOFRAN-ODT) 4 MG TbDL Take 1 tablet (4 mg total) by mouth every 8 (eight) hours as needed (nausea). 30 tablet 0       Allergies  Review of patient's allergies indicates:   Allergen Reactions    Augmentin [amoxicillin-pot clavulanate]     Macrobid [nitrofurantoin monohyd/m-cryst]     Sulfa (sulfonamide antibiotics)        Physical Examination   There were no vitals filed for this visit.  Physical Exam  Vitals reviewed.   Constitutional:       General: She is not in acute distress.     Appearance: Normal appearance. She is normal weight.   HENT:      Head: Normocephalic and atraumatic.      Right Ear: Tympanic membrane normal.      Left Ear: Tympanic membrane normal.      Nose: Congestion present. No rhinorrhea.      Comments: Right septal deviation.     Mouth/Throat:      Mouth: Mucous membranes are moist.      Pharynx: Oropharynx is clear. No oropharyngeal exudate or posterior oropharyngeal erythema.   Eyes:      General: No scleral icterus.     Extraocular Movements: Extraocular movements intact.      Conjunctiva/sclera: Conjunctivae normal.      Pupils: Pupils are equal, round, and reactive to light.   Neck:      Vascular: No carotid bruit.   Cardiovascular:      Rate and Rhythm: Normal rate and regular rhythm.      Pulses: Normal pulses.      Heart sounds: Normal heart sounds. No murmur heard.  No friction rub. No gallop.    Pulmonary:      Effort: Pulmonary effort is normal. No respiratory distress.      Breath sounds: Normal breath sounds. No wheezing.   Abdominal:      General: Abdomen is flat. Bowel sounds are normal. There is no distension.      Palpations: Abdomen is soft.      Tenderness: There is no abdominal tenderness.   Musculoskeletal:         General: Normal range of motion.      Cervical back: Normal range of motion and neck supple.      Right lower leg: No edema.      Left lower  leg: No edema.   Lymphadenopathy:      Cervical: No cervical adenopathy.   Skin:     General: Skin is warm and dry.      Capillary Refill: Capillary refill takes less than 2 seconds.      Coloration: Skin is not jaundiced.      Findings: No lesion.   Neurological:      General: No focal deficit present.      Mental Status: She is alert and oriented to person, place, and time. Mental status is at baseline.      Cranial Nerves: No cranial nerve deficit.      Sensory: No sensory deficit.      Motor: No weakness.      Coordination: Coordination normal.      Gait: Gait normal.      Deep Tendon Reflexes: Reflexes normal.   Psychiatric:         Mood and Affect: Mood normal.         Behavior: Behavior normal.         Thought Content: Thought content normal.         Judgment: Judgment normal.               Lab Results   Component Value Date    WBC 12.61 (H) 07/20/2021    HGB 13.3 07/20/2021    HCT 37.6 (L) 07/20/2021    MCV 88.7 07/20/2021     (H) 07/20/2021          Sodium   Date Value Ref Range Status   07/20/2021 137 136 - 145 mmol/L Final     Potassium   Date Value Ref Range Status   07/20/2021 3.8 3.5 - 5.1 mmol/L Final     Chloride   Date Value Ref Range Status   07/20/2021 102 98 - 107 mmol/L Final     CO2   Date Value Ref Range Status   07/20/2021 29 21 - 32 mmol/L Final     Glucose   Date Value Ref Range Status   07/20/2021 95 74 - 106 mg/dL Final     BUN   Date Value Ref Range Status   07/20/2021 18 7 - 18 mg/dL Final     Creatinine   Date Value Ref Range Status   07/20/2021 0.94 0.55 - 1.02 mg/dL Final     Calcium   Date Value Ref Range Status   07/20/2021 9.3 8.5 - 10.1 mg/dL Final     Total Protein   Date Value Ref Range Status   07/20/2021 7.4 6.4 - 8.2 g/dL Final     Albumin   Date Value Ref Range Status   07/20/2021 3.9 3.5 - 5.0 g/dL Final     Bilirubin, Total   Date Value Ref Range Status   07/20/2021 0.3 >0.0 - 1.2 mg/dL Final     Alk Phos   Date Value Ref Range Status   07/20/2021 103 55 - 142 U/L  Final     AST   Date Value Ref Range Status   07/20/2021 24 15 - 37 U/L Final     ALT   Date Value Ref Range Status   07/20/2021 33 13 - 56 U/L Final     Anion Gap   Date Value Ref Range Status   07/20/2021 10 7 - 16 mmol/L Final     eGFR   Date Value Ref Range Status   07/20/2021 62 >=60 mL/min/1.73m² Final      Mammo Digital Screening Bilat  Narrative: Result:   Mammo Digital Screening Bilat     History:  Patient is 76 y.o. and is seen for a screening mammogram.    Positive family history of breast cancer.    Films Compared:  Compared to: 10/13/2020 Mammo Digital Screening Bilat (No Change)     Findings:  The breasts are almost entirely fatty. There is no evidence of suspicious   masses, calcifications, or other abnormal findings.    There are some occasional scattered benign-appearing calcifications   without change.  Impression: Bilateral  There is no mammographic evidence of malignancy.    BI-RADS Category:   Overall: 2 - Benign     Recommendation:  Routine screening mammogram in 1 year is recommended.    Your estimated lifetime risk of breast cancer (to age 85) based on   Tyrer-Cuzick risk assessment model is Tyrer-Cuzick: 5.96 %. According to   the American Cancer Society, patients with a lifetime breast cancer risk   of 20% or higher might benefit from supplemental screening tests.     Procedures   Assessment and Plan (including Health Maintenance)      Problem List  Smart Sets  Document Outside HM   :    Plan:         Health Maintenance Due   Topic Date Due    Hepatitis C Screening  Never done    COVID-19 Vaccine (1) Never done    Shingles Vaccine (1 of 2) Never done    Influenza Vaccine (1) 09/01/2021       Problem List Items Addressed This Visit        ENT    Acute non-recurrent pansinusitis    Current Assessment & Plan     Patient has a persistent positive COVID test so will go ahead and treat her with a Medrol Dosepak and  Zithromax for her sinus infection.  Will also give her some Atrovent nasal  spray to open her sinuses.  Follow-up on a p.r.n. basis or in 1 week.  She will need to continue to isolate since she is COVID positive for at least 5 more days.         Relevant Medications    methylPREDNISolone (MEDROL DOSEPACK) 4 mg tablet    azithromycin (Z-MONIKA) 250 MG tablet    ipratropium (ATROVENT) 42 mcg (0.06 %) nasal spray       GI    Nausea and vomiting - Primary    Relevant Medications    ondansetron (ZOFRAN-ODT) 4 MG TbDL       Other    COVID-19    Current Assessment & Plan     Continued isolate.  If symptoms worsen she is to follow back in the clinic.         Relevant Medications    methylPREDNISolone (MEDROL DOSEPACK) 4 mg tablet    azithromycin (Z-MONIKA) 250 MG tablet    ipratropium (ATROVENT) 42 mcg (0.06 %) nasal spray      Other Visit Diagnoses     Sore throat              Health Maintenance Topics with due status: Not Due       Topic Last Completion Date    TETANUS VACCINE 01/30/2015    Lipid Panel 07/30/2020    DEXA SCAN 05/10/2021    Colorectal Cancer Screening 07/23/2021    Mammogram 11/08/2021       Future Appointments   Date Time Provider Department Center   5/2/2022  8:45 AM Lalo Hay MD [facility]   5/10/2022  9:00 AM RAYMUNDO Umaña [facility]   11/14/2022  1:30 PM [facility]        Follow up in about 4 weeks (around 3/7/2022).     Signature:  Jero Dallas [place] [address] [place]  [address]    Date of encounter: 2/7/22

## 2022-03-11 DIAGNOSIS — Z71.89 COMPLEX CARE COORDINATION: ICD-10-CM

## 2022-04-04 ENCOUNTER — TELEPHONE (OUTPATIENT)
Dept: FAMILY MEDICINE | Facility: CLINIC | Age: 77
End: 2022-04-04
Payer: MEDICARE

## 2022-04-04 DIAGNOSIS — I10 PRIMARY HYPERTENSION: Primary | ICD-10-CM

## 2022-04-04 RX ORDER — AMLODIPINE BESYLATE 10 MG/1
10 TABLET ORAL NIGHTLY
Qty: 90 TABLET | Refills: 3 | Status: SHIPPED | OUTPATIENT
Start: 2022-04-04 | End: 2023-04-03 | Stop reason: SDUPTHER

## 2022-05-02 ENCOUNTER — OFFICE VISIT (OUTPATIENT)
Dept: SPINE | Facility: CLINIC | Age: 77
End: 2022-05-02
Payer: MEDICARE

## 2022-05-02 ENCOUNTER — HOSPITAL ENCOUNTER (OUTPATIENT)
Dept: RADIOLOGY | Facility: HOSPITAL | Age: 77
Discharge: HOME | End: 2022-05-02
Attending: ORTHOPAEDIC SURGERY
Payer: MEDICARE

## 2022-05-02 DIAGNOSIS — M51.36 DDD (DEGENERATIVE DISC DISEASE), LUMBAR: ICD-10-CM

## 2022-05-02 DIAGNOSIS — M54.16 LUMBAR RADICULOPATHY: Primary | ICD-10-CM

## 2022-05-02 DIAGNOSIS — M54.16 LUMBAR RADICULOPATHY: ICD-10-CM

## 2022-05-02 DIAGNOSIS — M43.16 SPONDYLOLISTHESIS OF LUMBAR REGION: ICD-10-CM

## 2022-05-02 DIAGNOSIS — S32.010A CLOSED COMPRESSION FRACTURE OF BODY OF L1 VERTEBRA: ICD-10-CM

## 2022-05-02 PROCEDURE — 99213 OFFICE O/P EST LOW 20 MIN: CPT | Mod: PBBFAC | Performed by: ORTHOPAEDIC SURGERY

## 2022-05-02 PROCEDURE — 72110 X-RAY EXAM L-2 SPINE 4/>VWS: CPT | Mod: 26,,, | Performed by: ORTHOPAEDIC SURGERY

## 2022-05-02 PROCEDURE — 72110 X-RAY EXAM L-2 SPINE 4/>VWS: CPT | Mod: TC

## 2022-05-02 PROCEDURE — 99214 OFFICE O/P EST MOD 30 MIN: CPT | Mod: S$PBB,,, | Performed by: ORTHOPAEDIC SURGERY

## 2022-05-02 PROCEDURE — 72110 XR LUMBAR SPINE 4-5 VIEW WITH BENDING VIEWS: ICD-10-PCS | Mod: 26,,, | Performed by: ORTHOPAEDIC SURGERY

## 2022-05-02 PROCEDURE — 99214 PR OFFICE/OUTPT VISIT, EST, LEVL IV, 30-39 MIN: ICD-10-PCS | Mod: S$PBB,,, | Performed by: ORTHOPAEDIC SURGERY

## 2022-05-02 NOTE — PROGRESS NOTES
AP, lateral, flexion/extension views of the lumbar spine reviewed    On the AP there is left thoracolumbar curvature.  There are 5 non-rib-bearing lumbar vertebrae.  There has been prior T11 kyphoplasty.  On the lateral there is decreased lumbar lordosis.  There is spondylotic disease with decreased disc height and osteophyte formation noted.  Grade 1 anterolisthesis at L3-4.  T12 compression deformity.    Impression:  Spondylotic changes of the lumbar spine as noted above

## 2022-05-03 NOTE — PROGRESS NOTES
MDM/time:  Greater than 30 minutes spent on this encounter including 10 minutes reviewing imaging and notes, 15 minutes with the patient, 5 minutes documentation    ASSESSMENT:  77 y.o. female with lumbar degenerative scoliosis, L1 compression fracture, prior T12 kyphoplasty, lumbar spondylolisthesis with radiculopathy.    PLAN:  Referral to [facility] for osteoporosis evaluation.  Continue treatment with total pain care.  Follow-up as needed    HPI:  77 y.o. female here for repeat evaluation of low back pain that radiates into the right hip.  Reports that PT has helped with her bilateral lower extremity strength.  Has had rhizotomy with some improvement of her pain.  Suffered a compression fracture treated with kyphoplasty Dr. Donald.  Patient reports surgery January 2021 they moved and she painted walls she feels that this caused an increased amount of pain in her back.  She has been seen by chiropractor in taking Tylenol for pain, with little to no pain relief.  Patient denies difficulty with balance no recent falls.  Denies bladder bowel incontinence.  No difficulty walking distances.  Currently taking Tylenol as needed for pain.  No prior spine surgery.  Patient is currently not a smoker, quit smoking December of 2019.      IMAGING:  X-rays lumbar spine reviewed show:  On the AP there is left thoracolumbar curvature.  There are 5 non-rib-bearing lumbar vertebrae.  There has been prior T11 kyphoplasty.  On the lateral there is decreased lumbar lordosis.  There is spondylotic disease with decreased disc height and osteophyte formation noted.  Grade 1 anterolisthesis at L3-4.  T12 compression deformity.    8/12/2021 Lumbar spine MRI reviewed showed:   T12-L1: Retrolisthesis, circumferential disc bulge, and facet arthrosis as well as scoliosis results in mild-to-moderate bilateral neural foramen narrowing and mild spinal canal narrowing.  L1-L2: Facet arthrosis results in mild bilateral neural foramen narrowing.  The  spinal canal is patent.  L2-L3: Facet arthrosis and scoliosis results in mild bilateral neural foramen narrowing.  The spinal canal is patent.  L3-L4: Ligamentum flavum buckling, circumferential disc bulge and facet arthrosis results in severe left and severe right neural foramen narrowing as well as severe spinal canal narrowing.  L4-L5: The osteophytic spurring and facet arthrosis results in severe left neural foramen narrowing to the right neural foramen is patent.  The spinal canal is patent.  L5-S1: Normal  Degenerative change of the spinous processes.    Past Medical History:   Diagnosis Date    Abnormal weight loss     Acute bronchitis     Acute superficial gastritis without hemorrhage 9/1/2021    Altered bowel function     Anxiety     Back pain     Belching symptom     Benign hypertension     Cobalamin deficiency     Constipation     Contact dermatitis     Depression     Diarrhea     Disorder of muscle, ligament, and fascia     Dysphagia     Elevated white blood cell count     Encounter for screening colonoscopy 03/22/2016    Essential hypertension     Essential tremor     Excessive weight loss     Fracture of thoracic spine     Frequent urination     Ganglion, right wrist     GERD (gastroesophageal reflux disease)     HH (hiatus hernia) 9/1/2021    History of bone density study 10/26/2020    History of esophagogastroduodenoscopy (EGD) 06/23/2016    History of mammography, screening 03/29/2017    Hyperlipemia     Incomplete emptying of bladder     Insomnia     Multiple joint pain     Osteoarthritis     Osteopenia     Pain, lumbar region     Pap smear for cervical cancer screening 03/2010    Scoliosis     Stomach cramps     Subconjunctival hemorrhage     Vitamin D deficiency      Past Surgical History:   Procedure Laterality Date    ADENOIDECTOMY      BREAST BIOPSY      CERVIX LESION DESTRUCTION      CHOLECYSTECTOMY      HYSTERECTOMY      suspensiion of uterus   1965    TONSILLECTOMY      VERTEBROPLASTY  06/06/2013    Dr. alejandro     Social History     Tobacco Use    Smoking status: Former Smoker    Smokeless tobacco: Never Used   Substance Use Topics    Alcohol use: Yes     Alcohol/week: 1.0 standard drink     Types: 1 Glasses of wine per week     Comment: social    Drug use: Never      Current Outpatient Medications   Medication Instructions    amLODIPine (NORVASC) 10 mg, Oral, Nightly    B-complex with vitamin C (Z-BEC OR EQUIV) tablet 1 tablet, Oral, Daily    cholecalciferol (vitamin D3) 5,000 Units, Oral, Daily    gabapentin (NEURONTIN) 300 mg, Oral, 3 times daily    ipratropium (ATROVENT) 42 mcg (0.06 %) nasal spray 2 sprays, Nasal, 4 times daily    pantoprazole (PROTONIX) 40 mg, Oral, Daily    propranoloL (INDERAL) 40 mg, Oral, 2 times daily    triamterene-hydrochlorothiazide 37.5-25 mg (MAXZIDE-25) 37.5-25 mg per tablet 1 tablet, Oral, Daily PRN, 1/2 to 1 po daily PRN edema     vit C,R-Cb-dxsem-lutein-zeaxan (PRESERVISION AREDS-2) 250-90-40-1 mg Cap Oral        EXAM:  Constitutional  General Appearance:  There is no height or weight on file to calculate BMI., NAD  Psychiatric   Orientation: Oriented to time, oriented to place, oriented to person  Mood and Affect: Active and alert, normal mood, normal affect  Gait and Station   Appearance:  Normal gait, normal tandem gait, able to walk on toes, able to walk on heels    LUMBAR  Musculoskeletal System   Hips: Normal appearance, no leg length discrepancy, normal motion; left, normal motion; right    Lumbar Spine                   Inspection:  Normal alignment, normal sagittal balance                  Range of motion: decreased flexion, extension, lateral bending, rotation. Pain with range of motion                  Bony Palpation of the Lumbar Spine:  No tenderness of the spinous process, no tenderness of the sacrum, no tenderness of the coccyx                  Bony Palpation of the Right Hip:  No tenderness  of the iliac crest, no tenderness of the sciatic notch, no tenderness of the SI joint                  Bony Palpation of the Left Hip:  No tenderness of the iliac crest, no tenderness of the sciatic notch, no tenderness of the SI joint                  Soft Tissue Palpation on the Right:  No tenderness of the paraspinal region, no tenderness of the iliolumbar region                  Soft Tissue Palpation on the Left:  No tenderness of the paraspinal region, no tenderness of the iliolumbar region    Motor Strength   L1 Right:  Hip flexion iliopsoas 5/5    L1 Left:  Hip flexion iliopsoas 5/5              L2-L4 Right:  Knee extension quadriceps 5/5, tibialis anterior 5/5              L2-L4 Left:  Knee extension quadriceps 5/5, tibialis anterior 5/5   L5 Right:  Extensor hallucis llongus 5/5,    L5 Left:  Extensor hallucis longus 5/5,    S1 Right:  Plantar flexion gastrocnemius 5/5   S1 Left:  Plantar flexion gastrocnemius 5/5    Neurological System   Ankle Reflex Right:  normal   Ankle Reflex Left: normal   Knee Reflex Right:  normal   Knee Reflex Left:  normal   Sensation on the Right:  L2 normal, L3 normal, L4 normal, L5 normal, S1 normal   Sensation on the Left:  L2 normal, L3 normal, L4 normal, L5 normal, S1 normal              Special Test on the Right:  Seated straight leg raising test negative, no clonus of the ankle              Special Test on the Left:  Seated straight leg raising test negative, no clonus of the ankle    Skin   Lumbosacral Spine:  Normal skin    Cardiovascular System   Arterial Pulses Right:  Posterior tibialis normal, dorsalis pedis normal   Arterial Pulses Left:  Posterior tibialis normal, dorsalis pedis normal   Edema Right: None   Edema Left:  None

## 2022-05-09 PROBLEM — J01.40 ACUTE NON-RECURRENT PANSINUSITIS: Status: RESOLVED | Noted: 2022-02-07 | Resolved: 2022-05-09

## 2022-05-10 ENCOUNTER — OFFICE VISIT (OUTPATIENT)
Dept: GASTROENTEROLOGY | Facility: CLINIC | Age: 77
End: 2022-05-10
Payer: MEDICARE

## 2022-05-10 VITALS
WEIGHT: 135.63 LBS | DIASTOLIC BLOOD PRESSURE: 63 MMHG | HEIGHT: 61 IN | BODY MASS INDEX: 25.61 KG/M2 | HEART RATE: 82 BPM | SYSTOLIC BLOOD PRESSURE: 126 MMHG | OXYGEN SATURATION: 98 %

## 2022-05-10 DIAGNOSIS — Z83.719 FAMILY HISTORY OF COLONIC POLYPS: ICD-10-CM

## 2022-05-10 DIAGNOSIS — Z11.59 SCREENING FOR VIRAL DISEASE: ICD-10-CM

## 2022-05-10 DIAGNOSIS — K44.9 HH (HIATUS HERNIA): ICD-10-CM

## 2022-05-10 DIAGNOSIS — K31.84 GASTROPARESIS: Primary | ICD-10-CM

## 2022-05-10 PROCEDURE — 99214 OFFICE O/P EST MOD 30 MIN: CPT | Mod: ,,, | Performed by: NURSE PRACTITIONER

## 2022-05-10 PROCEDURE — 99214 PR OFFICE/OUTPT VISIT, EST, LEVL IV, 30-39 MIN: ICD-10-PCS | Mod: ,,, | Performed by: NURSE PRACTITIONER

## 2022-05-10 RX ORDER — CYCLOBENZAPRINE HCL 10 MG
10 TABLET ORAL 3 TIMES DAILY PRN
COMMUNITY
Start: 2022-03-18

## 2022-05-10 NOTE — PROGRESS NOTES
Masoud Major is a 77 y.o. female here for Follow-up        PCP: Charley Holm  Referring Provider: No referring provider defined for this encounter.     HPI:  Presents for follow up gastroparesis and GERD. States she is doing well as long as she follows the gastroparesis diet. She did have a nausea and vomiting episode on Sunday after eating Mexican with a salad. Improved now. EGD 9/1/21 reviewed. Last colonoscopy 3/22/16, no polyps. Patient reports today that her father had precancerous polyps. No hematochezia or melena.         ROS:  Review of Systems   Constitutional: Negative for appetite change, fatigue, fever and unexpected weight change.   HENT: Negative for trouble swallowing.    Respiratory: Negative for shortness of breath and wheezing.    Cardiovascular: Negative for chest pain.   Gastrointestinal: Positive for nausea, vomiting and reflux. Negative for abdominal pain, blood in stool, change in bowel habit, constipation, diarrhea (improved), rectal pain, fecal incontinence and change in bowel habit.   Genitourinary: Negative for dysuria.   Musculoskeletal: Negative for gait problem.   Integumentary:  Negative for pallor.   Neurological: Negative for dizziness.   Psychiatric/Behavioral: The patient is not nervous/anxious.           PMHX:  has a past medical history of Abnormal weight loss, Acute bronchitis, Acute superficial gastritis without hemorrhage (9/1/2021), Altered bowel function, Anxiety, Back pain, Belching symptom, Benign hypertension, Cobalamin deficiency, Constipation, Contact dermatitis, Depression, Diarrhea, Disorder of muscle, ligament, and fascia, Dysphagia, Elevated white blood cell count, Encounter for screening colonoscopy (03/22/2016), Essential hypertension, Essential tremor, Excessive weight loss, Fracture of thoracic spine, Frequent urination, Ganglion, right wrist, GERD (gastroesophageal reflux disease), HH (hiatus hernia) (9/1/2021), History of bone density study  (10/26/2020), History of esophagogastroduodenoscopy (EGD) (06/23/2016), History of mammography, screening (03/29/2017), Hyperlipemia, Incomplete emptying of bladder, Insomnia, Multiple joint pain, Osteoarthritis, Osteopenia, Pain, lumbar region, Pap smear for cervical cancer screening (03/2010), Scoliosis, Stomach cramps, Subconjunctival hemorrhage, and Vitamin D deficiency.    PSHX:  has a past surgical history that includes Tonsillectomy; Adenoidectomy; suspensiion of uterus (1965); Hysterectomy; Cervix lesion destruction; Cholecystectomy; Vertebroplasty (06/06/2013); and Breast biopsy.    PFHX: family history includes Breast cancer in her maternal aunt, maternal aunt, and sister; Cancer in her sister; Dementia in her mother; Diabetes in her mother; Heart disease in her mother; Hypertension in her mother.    PSlHX:  reports that she has quit smoking. She has never used smokeless tobacco. She reports current alcohol use of about 1.0 standard drink of alcohol per week. She reports that she does not use drugs.        Review of patient's allergies indicates:   Allergen Reactions    Augmentin [amoxicillin-pot clavulanate]     Macrobid [nitrofurantoin monohyd/m-cryst]     Sulfa (sulfonamide antibiotics)        Medication List with Changes/Refills   Current Medications    AMLODIPINE (NORVASC) 10 MG TABLET    Take 1 tablet (10 mg total) by mouth every evening.    B-COMPLEX WITH VITAMIN C (Z-BEC OR EQUIV) TABLET    Take 1 tablet by mouth once daily.    CHOLECALCIFEROL, VITAMIN D3, 125 MCG (5,000 UNIT) CAPSULE    Take 5,000 Units by mouth once daily.    CYCLOBENZAPRINE (FLEXERIL) 10 MG TABLET    Take 10 mg by mouth 3 (three) times daily as needed.    GABAPENTIN (NEURONTIN) 300 MG CAPSULE    Take 1 capsule (300 mg total) by mouth 3 (three) times daily.    IPRATROPIUM (ATROVENT) 42 MCG (0.06 %) NASAL SPRAY    2 sprays by Nasal route 4 (four) times daily.    PANTOPRAZOLE (PROTONIX) 40 MG TABLET    Take 1 tablet (40 mg  "total) by mouth once daily.    PROPRANOLOL (INDERAL) 40 MG TABLET    Take 1 tablet (40 mg total) by mouth 2 (two) times daily.    TRIAMTERENE-HYDROCHLOROTHIAZIDE 37.5-25 MG (MAXZIDE-25) 37.5-25 MG PER TABLET    Take 1 tablet by mouth daily as needed (edema). 1/2 to 1 po daily PRN edema    VIT C,L-YM-MISHG-LUTEIN-ZEAXAN (PRESERVISION AREDS-2) 250-90-40-1 MG CAP    Take by mouth.        Objective Findings:  Vital Signs:  /63   Pulse 82   Ht 5' 1" (1.549 m)   Wt 61.5 kg (135 lb 9.6 oz)   SpO2 98%   BMI 25.62 kg/m²  Body mass index is 25.62 kg/m².    Physical Exam:  Physical Exam  Vitals and nursing note reviewed.   Constitutional:       General: She is not in acute distress.     Appearance: Normal appearance. She is not ill-appearing.   HENT:      Mouth/Throat:      Mouth: Mucous membranes are moist.   Eyes:      Extraocular Movements: Extraocular movements intact.   Cardiovascular:      Rate and Rhythm: Normal rate.      Heart sounds: No murmur heard.  Pulmonary:      Breath sounds: No wheezing, rhonchi or rales.   Abdominal:      General: Bowel sounds are normal. There is no distension.      Palpations: Abdomen is soft. There is no mass.      Tenderness: There is no abdominal tenderness. There is no guarding or rebound.      Hernia: No hernia is present.   Musculoskeletal:      Right lower leg: No edema.      Left lower leg: No edema.   Skin:     General: Skin is warm and dry.      Coloration: Skin is not jaundiced or pale.   Neurological:      Mental Status: She is alert and oriented to person, place, and time.   Psychiatric:         Mood and Affect: Mood normal.          Labs:  Lab Results   Component Value Date    WBC 12.61 (H) 07/20/2021    HGB 13.3 07/20/2021    HCT 37.6 (L) 07/20/2021    MCV 88.7 07/20/2021    RDW 11.9 07/20/2021     (H) 07/20/2021    LYMPH 25.3 (L) 07/20/2021    LYMPH 3.19 07/20/2021    MONO 6.7 (H) 07/20/2021    EOS 0.18 07/20/2021    BASO 0.09 07/20/2021     Lab Results "   Component Value Date     07/20/2021    K 3.8 07/20/2021     07/20/2021    CO2 29 07/20/2021    GLU 95 07/20/2021    BUN 18 07/20/2021    CREATININE 0.94 07/20/2021    CALCIUM 9.3 07/20/2021    PROT 7.4 07/20/2021    ALBUMIN 3.9 07/20/2021    BILITOT 0.3 07/20/2021    ALKPHOS 103 07/20/2021    AST 24 07/20/2021    ALT 33 07/20/2021         Imaging: X-Ray Lumbar 4-5 View including Bending Views    Result Date: 5/2/2022  See Procedure Notes for results. IMPRESSION: Please see Ortho procedure notes for report.  This procedure was auto-finalized by: Virtual Radiologist        Assessment:  Masoud Major is a 77 y.o. female here with:  1. Gastroparesis    2. HH (hiatus hernia)    3. Family history of colonic polyps    4. Screening for viral disease          Recommendations:  1. Low residue, small frequent meals. Avoid raw fruits and vegetables  2. Avoid spicy, greasy foods  Avoid caffeine, citric acid, chocolate, peppermint, and carbonated drinks  Do not lay down within 3 hours of eating  Exercise 150 minutes per week  Increase fluid to 64 ounces daily  Avoid antiinflammatory medications such as motrin, advil, aleve, ibuprofen, and BC powder  3. Schedule colonoscopy, family history of colon polyps in the patient's father  4. Start drinking Miralax early am the day before due to gastroparesis        No follow-ups on file.      Order summary:  Orders Placed This Encounter    POCT COVID-19 Rapid Screening    Colonoscopy       Thank you for allowing me to participate in the care of Masoud Major.      JAGDISH Aviles

## 2022-06-13 ENCOUNTER — HOSPITAL ENCOUNTER (OUTPATIENT)
Dept: GASTROENTEROLOGY | Facility: HOSPITAL | Age: 77
Discharge: HOME | End: 2022-06-13
Attending: NURSE PRACTITIONER
Payer: MEDICARE

## 2022-06-13 ENCOUNTER — ANESTHESIA EVENT (OUTPATIENT)
Dept: GASTROENTEROLOGY | Facility: HOSPITAL | Age: 77
End: 2022-06-13
Payer: MEDICARE

## 2022-06-13 ENCOUNTER — ANESTHESIA (OUTPATIENT)
Dept: GASTROENTEROLOGY | Facility: HOSPITAL | Age: 77
End: 2022-06-13
Payer: MEDICARE

## 2022-06-13 VITALS
DIASTOLIC BLOOD PRESSURE: 68 MMHG | WEIGHT: 130 LBS | SYSTOLIC BLOOD PRESSURE: 115 MMHG | RESPIRATION RATE: 17 BRPM | BODY MASS INDEX: 24.55 KG/M2 | HEIGHT: 61 IN | HEART RATE: 90 BPM | OXYGEN SATURATION: 97 % | TEMPERATURE: 98 F

## 2022-06-13 DIAGNOSIS — K57.30 COLON, DIVERTICULOSIS: ICD-10-CM

## 2022-06-13 DIAGNOSIS — Z12.11 SCREENING FOR COLON CANCER: ICD-10-CM

## 2022-06-13 DIAGNOSIS — Z83.719 FAMILY HISTORY OF COLONIC POLYPS: ICD-10-CM

## 2022-06-13 PROCEDURE — D9220A PRA ANESTHESIA: Mod: ,,, | Performed by: NURSE ANESTHETIST, CERTIFIED REGISTERED

## 2022-06-13 PROCEDURE — 63600175 PHARM REV CODE 636 W HCPCS: Performed by: NURSE ANESTHETIST, CERTIFIED REGISTERED

## 2022-06-13 PROCEDURE — G0105 COLORECTAL SCRN; HI RISK IND: HCPCS

## 2022-06-13 PROCEDURE — 37000008 HC ANESTHESIA 1ST 15 MINUTES

## 2022-06-13 PROCEDURE — 25000003 PHARM REV CODE 250: Performed by: NURSE ANESTHETIST, CERTIFIED REGISTERED

## 2022-06-13 PROCEDURE — D9220A PRA ANESTHESIA: ICD-10-PCS | Mod: ,,, | Performed by: NURSE ANESTHETIST, CERTIFIED REGISTERED

## 2022-06-13 PROCEDURE — 37000009 HC ANESTHESIA EA ADD 15 MINS

## 2022-06-13 PROCEDURE — 27000284 HC CANNULA NASAL: Performed by: NURSE ANESTHETIST, CERTIFIED REGISTERED

## 2022-06-13 PROCEDURE — 27201423 OPTIME MED/SURG SUP & DEVICES STERILE SUPPLY

## 2022-06-13 RX ORDER — SODIUM CHLORIDE 0.9 % (FLUSH) 0.9 %
10 SYRINGE (ML) INJECTION
Status: DISCONTINUED | OUTPATIENT
Start: 2022-06-13 | End: 2022-06-14 | Stop reason: HOSPADM

## 2022-06-13 RX ORDER — LIDOCAINE HYDROCHLORIDE 20 MG/ML
INJECTION, SOLUTION EPIDURAL; INFILTRATION; INTRACAUDAL; PERINEURAL
Status: DISCONTINUED | OUTPATIENT
Start: 2022-06-13 | End: 2022-06-13

## 2022-06-13 RX ORDER — PROPOFOL 10 MG/ML
VIAL (ML) INTRAVENOUS
Status: DISCONTINUED | OUTPATIENT
Start: 2022-06-13 | End: 2022-06-13

## 2022-06-13 RX ADMIN — PROPOFOL 15 MG: 10 INJECTION, EMULSION INTRAVENOUS at 10:06

## 2022-06-13 RX ADMIN — PROPOFOL 50 MG: 10 INJECTION, EMULSION INTRAVENOUS at 10:06

## 2022-06-13 RX ADMIN — LIDOCAINE HYDROCHLORIDE 60 MG: 20 INJECTION, SOLUTION INTRAVENOUS at 10:06

## 2022-06-13 RX ADMIN — PROPOFOL 20 MG: 10 INJECTION, EMULSION INTRAVENOUS at 10:06

## 2022-06-13 RX ADMIN — SODIUM CHLORIDE: 9 INJECTION, SOLUTION INTRAVENOUS at 10:06

## 2022-06-13 RX ADMIN — PROPOFOL 25 MG: 10 INJECTION, EMULSION INTRAVENOUS at 10:06

## 2022-06-13 NOTE — H&P
[facility] - Endoscopy  Gastroenterology  H&P    Patient Name: Masoud Major  MRN: [id number]  Admission Date: 6/13/2022  Code Status: Full Code    Attending Provider: Oziel Espino MD  Primary Care Physician: Charley Holm MD  Principal Problem:<principal problem not specified>    Subjective:     History of Present Illness: Pt has family hx of colon polyps in her father. Her last colonoscopy was six years ago.    Past Medical History:   Diagnosis Date    Abnormal weight loss     Acute bronchitis     Acute superficial gastritis without hemorrhage 9/1/2021    Altered bowel function     Anxiety     Back pain     Belching symptom     Benign hypertension     Cobalamin deficiency     Constipation     Contact dermatitis     Depression     Diarrhea     Disorder of muscle, ligament, and fascia     Dysphagia     Elevated white blood cell count     Encounter for screening colonoscopy 03/22/2016    Essential hypertension     Essential tremor     Excessive weight loss     Fracture of thoracic spine     Frequent urination     Ganglion, right wrist     GERD (gastroesophageal reflux disease)     HH (hiatus hernia) 9/1/2021    History of bone density study 10/26/2020    History of esophagogastroduodenoscopy (EGD) 06/23/2016    History of mammography, screening 03/29/2017    Hyperlipemia     Incomplete emptying of bladder     Insomnia     Multiple joint pain     Osteoarthritis     Osteopenia     Pain, lumbar region     Pap smear for cervical cancer screening 03/2010    Scoliosis     Stomach cramps     Subconjunctival hemorrhage     Vitamin D deficiency        Past Surgical History:   Procedure Laterality Date    ADENOIDECTOMY      BREAST BIOPSY      CERVIX LESION DESTRUCTION      CHOLECYSTECTOMY      HYSTERECTOMY      suspensiion of uterus  1965    TONSILLECTOMY      VERTEBROPLASTY  06/06/2013    Dr. alejandro       Review of patient's allergies indicates:   Allergen Reactions     Augmentin [amoxicillin-pot clavulanate]     Macrobid [nitrofurantoin monohyd/m-cryst]     Sulfa (sulfonamide antibiotics)      Family History     Problem Relation (Age of Onset)    Breast cancer Sister, Maternal Aunt, Maternal Aunt    Cancer Sister    Dementia Mother    Diabetes Mother    Heart disease Mother    Hypertension Mother        Tobacco Use    Smoking status: Former Smoker    Smokeless tobacco: Never Used   Substance and Sexual Activity    Alcohol use: Yes     Alcohol/week: 1.0 standard drink     Types: 1 Glasses of wine per week     Comment: social    Drug use: Never    Sexual activity: Not Currently     Review of Systems   Respiratory: Negative.    Cardiovascular: Negative.    Gastrointestinal: Negative.      Objective:     Vital Signs (Most Recent):  Temp: 98.3 °F (36.8 °C) (06/13/22 1000)  Pulse: 104 (06/13/22 1007)  Resp: 14 (06/13/22 1007)  BP: (!) 143/67 (06/13/22 1007)  SpO2: 98 % (06/13/22 1007) Vital Signs (24h Range):  Temp:  [98.3 °F (36.8 °C)] 98.3 °F (36.8 °C)  Pulse:  [104] 104  Resp:  [14] 14  SpO2:  [98 %] 98 %  BP: (143)/(67) 143/67     Weight: 59 kg (130 lb) (06/13/22 1000)  Body mass index is 24.56 kg/m².    No intake or output data in the 24 hours ending 06/13/22 1026    Lines/Drains/Airways     Peripheral Intravenous Line  Duration                Peripheral IV - Single Lumen 06/13/22 1008 22 G Anterior;Right Forearm <1 day                Physical Exam  Vitals reviewed.   Constitutional:       General: She is not in acute distress.     Appearance: Normal appearance. She is well-developed. She is not ill-appearing.   HENT:      Head: Normocephalic and atraumatic.      Nose: Nose normal.   Eyes:      Pupils: Pupils are equal, round, and reactive to light.   Cardiovascular:      Rate and Rhythm: Normal rate and regular rhythm.   Pulmonary:      Effort: Pulmonary effort is normal.      Breath sounds: Normal breath sounds. No wheezing.   Abdominal:      General: Abdomen is flat.  Bowel sounds are normal. There is no distension.      Palpations: Abdomen is soft.      Tenderness: There is no abdominal tenderness. There is no guarding.   Skin:     General: Skin is warm and dry.      Coloration: Skin is not jaundiced.   Neurological:      Mental Status: She is alert.   Psychiatric:         Attention and Perception: Attention normal.         Mood and Affect: Affect normal.         Speech: Speech normal.         Behavior: Behavior is cooperative.      Comments: Pt was calm while speaking.         Significant Labs:  CBC: No results for input(s): WBC, HGB, HCT, PLT in the last 48 hours.  CMP: No results for input(s): GLU, CALCIUM, ALBUMIN, PROT, NA, K, CO2, CL, BUN, CREATININE, ALKPHOS, ALT, AST, BILITOT in the last 48 hours.    Significant Imaging:  Imaging results within the past 24 hours have been reviewed.    Assessment/Plan:     There are no hospital problems to display for this patient.        Imp: family hx of colon polyp  Plan: colonoscopy    Oziel Espino MD  Gastroenterology  [facility] - Endoscopy

## 2022-06-13 NOTE — DISCHARGE INSTRUCTIONS
Procedure Date  6/13/22     Impression  Overall Impression: No polyps were seen. Diverticula were present in the sigmoid and descending colon.     Recommendation  Repeat colonoscopy in 5 years; high fiber diet.  NO DRIVING, OPERATING EQUIPMENT, OR SIGNING LEGAL DOCUMENTS FOR 24 HOURS.

## 2022-06-13 NOTE — TRANSFER OF CARE
"Anesthesia Transfer of Care Note    Patient: Masoud Major    Procedure(s) Performed: Colonoscopy    Patient location: GI    Anesthesia Type: general    Transport from OR: Transported from OR on room air with adequate spontaneous ventilation    Post pain: adequate analgesia    Post assessment: no apparent anesthetic complications and tolerated procedure well    Post vital signs: stable    Level of consciousness: responds to stimulation and sedated    Nausea/Vomiting: no nausea/vomiting    Complications: none    Transfer of care protocol was followed      Last vitals:   Visit Vitals  /74 (BP Location: Left arm, Patient Position: Lying)   Pulse 98   Temp 36.7 °C (98.1 °F) (Skin)   Resp 12   Ht 5' 1" (1.549 m)   Wt 59 kg (130 lb)   SpO2 100%   Breastfeeding No   BMI 24.56 kg/m²     "

## 2022-06-13 NOTE — ANESTHESIA PREPROCEDURE EVALUATION
06/13/2022  Masoud Major is a 77 y.o., female.    Past Medical History:   Diagnosis Date    Abnormal weight loss     Acute bronchitis     Acute superficial gastritis without hemorrhage 9/1/2021    Altered bowel function     Anxiety     Back pain     Belching symptom     Benign hypertension     Cobalamin deficiency     Constipation     Contact dermatitis     Depression     Diarrhea     Disorder of muscle, ligament, and fascia     Dysphagia     Elevated white blood cell count     Encounter for screening colonoscopy 03/22/2016    Essential hypertension     Essential tremor     Excessive weight loss     Fracture of thoracic spine     Frequent urination     Ganglion, right wrist     GERD (gastroesophageal reflux disease)     HH (hiatus hernia) 9/1/2021    History of bone density study 10/26/2020    History of esophagogastroduodenoscopy (EGD) 06/23/2016    History of mammography, screening 03/29/2017    Hyperlipemia     Incomplete emptying of bladder     Insomnia     Multiple joint pain     Osteoarthritis     Osteopenia     Pain, lumbar region     Pap smear for cervical cancer screening 03/2010    Scoliosis     Stomach cramps     Subconjunctival hemorrhage     Vitamin D deficiency        Past Surgical History:   Procedure Laterality Date    ADENOIDECTOMY      BREAST BIOPSY      CERVIX LESION DESTRUCTION      CHOLECYSTECTOMY      HYSTERECTOMY      suspensiion of uterus  1965    TONSILLECTOMY      VERTEBROPLASTY  06/06/2013    Dr. alejandro       Family History   Problem Relation Age of Onset    Dementia Mother     Heart disease Mother     Hypertension Mother     Diabetes Mother     Cancer Sister     Breast cancer Sister     Breast cancer Maternal Aunt     Breast cancer Maternal Aunt        Social History     Socioeconomic History    Marital status:     Occupational History    Occupation: retired   Tobacco Use    Smoking status: Former Smoker    Smokeless tobacco: Never Used   Substance and Sexual Activity    Alcohol use: Yes     Alcohol/week: 1.0 standard drink     Types: 1 Glasses of wine per week     Comment: social    Drug use: Never    Sexual activity: Not Currently       Current Outpatient Medications   Medication Sig Dispense Refill    amLODIPine (NORVASC) 10 MG tablet Take 1 tablet (10 mg total) by mouth every evening. 90 tablet 3    B-complex with vitamin C (Z-BEC OR EQUIV) tablet Take 1 tablet by mouth once daily.      cholecalciferol, vitamin D3, 125 mcg (5,000 unit) capsule Take 5,000 Units by mouth once daily.      cyclobenzaprine (FLEXERIL) 10 MG tablet Take 10 mg by mouth 3 (three) times daily as needed.      gabapentin (NEURONTIN) 300 MG capsule Take 1 capsule (300 mg total) by mouth 3 (three) times daily. (Patient not taking: No sig reported) 90 capsule 11    ipratropium (ATROVENT) 42 mcg (0.06 %) nasal spray 2 sprays by Nasal route 4 (four) times daily. 15 mL 1    pantoprazole (PROTONIX) 40 MG tablet Take 1 tablet (40 mg total) by mouth once daily. 30 tablet 6    propranoloL (INDERAL) 40 MG tablet Take 1 tablet (40 mg total) by mouth 2 (two) times daily. 60 tablet 11    triamterene-hydrochlorothiazide 37.5-25 mg (MAXZIDE-25) 37.5-25 mg per tablet Take 1 tablet by mouth daily as needed (edema). 1/2 to 1 po daily PRN edema 90 tablet 3    vit C,N-Wy-kjzkj-lutein-zeaxan (PRESERVISION AREDS-2) 250-90-40-1 mg Cap Take by mouth.       Current Facility-Administered Medications   Medication Dose Route Frequency Provider Last Rate Last Admin    sodium chloride 0.9% flush 10 mL  10 mL Intravenous PRN Oziel Espino MD           Review of patient's allergies indicates:   Allergen Reactions    Augmentin [amoxicillin-pot clavulanate]     Macrobid [nitrofurantoin monohyd/m-cryst]     Sulfa (sulfonamide antibiotics)        Pre-op  Assessment    I have reviewed the Patient Summary Reports.    I have reviewed the NPO Status.   I have reviewed the Medications.     Review of Systems  Anesthesia Hx:  Hx of Anesthetic complications Nausea   Cardiovascular:   Hypertension hyperlipidemia    Hepatic/GI:   Hiatal Hernia, GERD    Musculoskeletal:   Arthritis     Psych:   Psychiatric History          Physical Exam    Airway:  Mallampati: II   Mouth Opening: Normal  TM Distance: Normal  Tongue: Normal        Anesthesia Plan  Type of Anesthesia, risks & benefits discussed:    Anesthesia Type: Gen Natural Airway  Intra-op Monitoring Plan: Standard ASA Monitors  Post Op Pain Control Plan: multimodal analgesia  Induction:  IV  Informed Consent: Informed consent signed with the Patient and all parties understand the risks and agree with anesthesia plan.  All questions answered. Patient consented to blood products? Yes  ASA Score: 3  Day of Surgery Review of History & Physical: H&P Update referred to the surgeon/provider.    Ready For Surgery From Anesthesia Perspective.     .

## 2022-06-13 NOTE — ANESTHESIA POSTPROCEDURE EVALUATION
Anesthesia Post Evaluation    Patient: Masoud Major    Procedure(s) Performed: Colonoscopy    Final Anesthesia Type: general      Patient location during evaluation: GI PACU  Patient participation: Yes- Able to Participate  Level of consciousness: awake and alert  Post-procedure vital signs: reviewed and stable  Pain management: adequate  Airway patency: patent  ANA PAULA mitigation strategies: Multimodal analgesia  PONV status at discharge: No PONV  Anesthetic complications: no      Cardiovascular status: stable  Respiratory status: unassisted  Hydration status: euvolemic  Follow-up not needed.          Vitals Value Taken Time   /68 06/13/22 1122   Temp 36.7C 06/13/22 1132   Pulse 98 06/13/22 1123   Resp 16 06/13/22 1123   SpO2 100 % 06/13/22 1122   Vitals shown include unvalidated device data.      No case tracking events are documented in the log.      Pain/Daphnie Score: Daphnie Score: 10 (6/13/2022 11:05 AM)

## 2022-06-15 RX ORDER — ONDANSETRON 4 MG/1
4 TABLET, FILM COATED ORAL EVERY 8 HOURS PRN
Qty: 30 TABLET | Refills: 11 | Status: SHIPPED | OUTPATIENT
Start: 2022-06-15 | End: 2022-07-15

## 2022-06-15 RX ORDER — TRIAMTERENE/HYDROCHLOROTHIAZID 37.5-25 MG
1 TABLET ORAL DAILY PRN
Qty: 90 TABLET | Refills: 3 | Status: SHIPPED | OUTPATIENT
Start: 2022-06-15 | End: 2023-08-30 | Stop reason: SDUPTHER

## 2022-06-15 RX ORDER — ONDANSETRON 4 MG/1
4 TABLET, FILM COATED ORAL EVERY 8 HOURS PRN
COMMUNITY
End: 2022-06-15 | Stop reason: SDUPTHER

## 2022-07-06 RX ORDER — PANTOPRAZOLE SODIUM 40 MG/1
40 TABLET, DELAYED RELEASE ORAL DAILY
Qty: 90 TABLET | Refills: 3 | Status: SHIPPED | OUTPATIENT
Start: 2022-07-06 | End: 2023-08-03 | Stop reason: SDUPTHER

## 2022-10-09 DIAGNOSIS — Z71.89 COMPLEX CARE COORDINATION: ICD-10-CM

## 2022-12-01 ENCOUNTER — OFFICE VISIT (OUTPATIENT)
Dept: FAMILY MEDICINE | Facility: CLINIC | Age: 77
End: 2022-12-01
Payer: MEDICARE

## 2022-12-01 VITALS
WEIGHT: 135 LBS | HEIGHT: 61 IN | BODY MASS INDEX: 25.49 KG/M2 | OXYGEN SATURATION: 96 % | RESPIRATION RATE: 20 BRPM | DIASTOLIC BLOOD PRESSURE: 78 MMHG | HEART RATE: 97 BPM | SYSTOLIC BLOOD PRESSURE: 124 MMHG

## 2022-12-01 DIAGNOSIS — K59.00 CONSTIPATION, UNSPECIFIED CONSTIPATION TYPE: ICD-10-CM

## 2022-12-01 DIAGNOSIS — R35.0 FREQUENT URINATION: ICD-10-CM

## 2022-12-01 DIAGNOSIS — I10 PRIMARY HYPERTENSION: Primary | ICD-10-CM

## 2022-12-01 DIAGNOSIS — J06.9 UPPER RESPIRATORY TRACT INFECTION, UNSPECIFIED TYPE: ICD-10-CM

## 2022-12-01 DIAGNOSIS — K31.84 GASTROPARESIS: ICD-10-CM

## 2022-12-01 DIAGNOSIS — M85.80 OSTEOPENIA AFTER MENOPAUSE: Chronic | ICD-10-CM

## 2022-12-01 DIAGNOSIS — Z78.0 OSTEOPENIA AFTER MENOPAUSE: Chronic | ICD-10-CM

## 2022-12-01 DIAGNOSIS — J04.0 LARYNGITIS: ICD-10-CM

## 2022-12-01 DIAGNOSIS — G25.0 BENIGN HEAD TREMOR: Chronic | ICD-10-CM

## 2022-12-01 DIAGNOSIS — R19.7 DIARRHEA, UNSPECIFIED TYPE: ICD-10-CM

## 2022-12-01 DIAGNOSIS — K21.9 GASTROESOPHAGEAL REFLUX DISEASE, UNSPECIFIED WHETHER ESOPHAGITIS PRESENT: Chronic | ICD-10-CM

## 2022-12-01 LAB
25(OH)D3 SERPL-MCNC: 41.2 NG/ML
ALBUMIN SERPL BCP-MCNC: 3.8 G/DL (ref 3.5–5)
ALBUMIN/GLOB SERPL: 1.3 {RATIO}
ALP SERPL-CCNC: 119 U/L (ref 55–142)
ALT SERPL W P-5'-P-CCNC: 43 U/L (ref 13–56)
ANION GAP SERPL CALCULATED.3IONS-SCNC: 10 MMOL/L (ref 7–16)
AST SERPL W P-5'-P-CCNC: 24 U/L (ref 15–37)
BACTERIA #/AREA URNS HPF: ABNORMAL /HPF
BASOPHILS # BLD AUTO: 0.08 K/UL (ref 0–0.2)
BASOPHILS NFR BLD AUTO: 0.5 % (ref 0–1)
BILIRUB SERPL-MCNC: 0.5 MG/DL (ref ?–1.2)
BILIRUB UR QL STRIP: NEGATIVE
BUN SERPL-MCNC: 22 MG/DL (ref 7–18)
BUN/CREAT SERPL: 28 (ref 6–20)
CALCIUM SERPL-MCNC: 9.1 MG/DL (ref 8.5–10.1)
CHLORIDE SERPL-SCNC: 99 MMOL/L (ref 98–107)
CHOLEST SERPL-MCNC: 267 MG/DL (ref 0–200)
CHOLEST/HDLC SERPL: 5.8 {RATIO}
CLARITY UR: CLEAR
CO2 SERPL-SCNC: 27 MMOL/L (ref 21–32)
COLOR UR: YELLOW
CREAT SERPL-MCNC: 0.8 MG/DL (ref 0.55–1.02)
DIFFERENTIAL METHOD BLD: ABNORMAL
EGFR (NO RACE VARIABLE) (RUSH/TITUS): 77 ML/MIN/1.73M²
EOSINOPHIL # BLD AUTO: 0.21 K/UL (ref 0–0.5)
EOSINOPHIL NFR BLD AUTO: 1.3 % (ref 1–4)
ERYTHROCYTE [DISTWIDTH] IN BLOOD BY AUTOMATED COUNT: 12.7 % (ref 11.5–14.5)
GLOBULIN SER-MCNC: 3 G/DL (ref 2–4)
GLUCOSE SERPL-MCNC: 108 MG/DL (ref 74–106)
GLUCOSE UR STRIP-MCNC: NORMAL MG/DL
HCT VFR BLD AUTO: 37.7 % (ref 38–47)
HDLC SERPL-MCNC: 46 MG/DL (ref 40–60)
HGB BLD-MCNC: 13.2 G/DL (ref 12–16)
IMM GRANULOCYTES # BLD AUTO: 0.07 K/UL (ref 0–0.04)
IMM GRANULOCYTES NFR BLD: 0.4 % (ref 0–0.4)
KETONES UR STRIP-SCNC: NEGATIVE MG/DL
LEUKOCYTE ESTERASE UR QL STRIP: NEGATIVE
LYMPHOCYTES # BLD AUTO: 2.88 K/UL (ref 1–4.8)
LYMPHOCYTES NFR BLD AUTO: 17.4 % (ref 27–41)
MCH RBC QN AUTO: 31.9 PG (ref 27–31)
MCHC RBC AUTO-ENTMCNC: 35 G/DL (ref 32–36)
MCV RBC AUTO: 91.1 FL (ref 80–96)
MONOCYTES # BLD AUTO: 1.01 K/UL (ref 0–0.8)
MONOCYTES NFR BLD AUTO: 6.1 % (ref 2–6)
MPC BLD CALC-MCNC: 8.8 FL (ref 9.4–12.4)
MUCOUS, UA: ABNORMAL /LPF
NEUTROPHILS # BLD AUTO: 12.27 K/UL (ref 1.8–7.7)
NEUTROPHILS NFR BLD AUTO: 74.3 % (ref 53–65)
NITRITE UR QL STRIP: POSITIVE
NONHDLC SERPL-MCNC: 221 MG/DL
NRBC # BLD AUTO: 0 X10E3/UL
NRBC, AUTO (.00): 0 %
PH UR STRIP: 5.5 PH UNITS
PLATELET # BLD AUTO: 558 K/UL (ref 150–400)
POTASSIUM SERPL-SCNC: 4.2 MMOL/L (ref 3.5–5.1)
PROT SERPL-MCNC: 6.8 G/DL (ref 6.4–8.2)
PROT UR QL STRIP: NEGATIVE
RBC # BLD AUTO: 4.14 M/UL (ref 4.2–5.4)
RBC # UR STRIP: NEGATIVE /UL
RBC #/AREA URNS HPF: <1 /HPF
SODIUM SERPL-SCNC: 132 MMOL/L (ref 136–145)
SP GR UR STRIP: 1.02
SQUAMOUS #/AREA URNS LPF: ABNORMAL /HPF
TRIGL SERPL-MCNC: 702 MG/DL (ref 35–150)
TSH SERPL DL<=0.005 MIU/L-ACNC: 1.34 UIU/ML (ref 0.36–3.74)
UROBILINOGEN UR STRIP-ACNC: NORMAL MG/DL
VLDLC SERPL-MCNC: ABNORMAL MG/DL
WBC # BLD AUTO: 16.52 K/UL (ref 4.5–11)
WBC #/AREA URNS HPF: 1 /HPF

## 2022-12-01 PROCEDURE — 96372 PR INJECTION,THERAP/PROPH/DIAG2ST, IM OR SUBCUT: ICD-10-PCS | Mod: ,,, | Performed by: FAMILY MEDICINE

## 2022-12-01 PROCEDURE — 99214 OFFICE O/P EST MOD 30 MIN: CPT | Mod: ,,, | Performed by: FAMILY MEDICINE

## 2022-12-01 PROCEDURE — 84443 TSH: ICD-10-PCS | Mod: ,,, | Performed by: CLINICAL MEDICAL LABORATORY

## 2022-12-01 PROCEDURE — 81001 URINALYSIS, REFLEX TO URINE CULTURE: ICD-10-PCS | Mod: ,,, | Performed by: CLINICAL MEDICAL LABORATORY

## 2022-12-01 PROCEDURE — 82306 VITAMIN D: ICD-10-PCS | Mod: ,,, | Performed by: CLINICAL MEDICAL LABORATORY

## 2022-12-01 PROCEDURE — 85025 CBC WITH DIFFERENTIAL: ICD-10-PCS | Mod: ,,, | Performed by: CLINICAL MEDICAL LABORATORY

## 2022-12-01 PROCEDURE — 81001 URINALYSIS AUTO W/SCOPE: CPT | Mod: ,,, | Performed by: CLINICAL MEDICAL LABORATORY

## 2022-12-01 PROCEDURE — 80053 COMPREHEN METABOLIC PANEL: CPT | Mod: ,,, | Performed by: CLINICAL MEDICAL LABORATORY

## 2022-12-01 PROCEDURE — 85025 COMPLETE CBC W/AUTO DIFF WBC: CPT | Mod: ,,, | Performed by: CLINICAL MEDICAL LABORATORY

## 2022-12-01 PROCEDURE — 80053 COMPREHENSIVE METABOLIC PANEL: ICD-10-PCS | Mod: ,,, | Performed by: CLINICAL MEDICAL LABORATORY

## 2022-12-01 PROCEDURE — 96372 THER/PROPH/DIAG INJ SC/IM: CPT | Mod: ,,, | Performed by: FAMILY MEDICINE

## 2022-12-01 PROCEDURE — 84443 ASSAY THYROID STIM HORMONE: CPT | Mod: ,,, | Performed by: CLINICAL MEDICAL LABORATORY

## 2022-12-01 PROCEDURE — 80061 LIPID PANEL: CPT | Mod: ,,, | Performed by: CLINICAL MEDICAL LABORATORY

## 2022-12-01 PROCEDURE — 80061 LIPID PANEL: ICD-10-PCS | Mod: ,,, | Performed by: CLINICAL MEDICAL LABORATORY

## 2022-12-01 PROCEDURE — 82306 VITAMIN D 25 HYDROXY: CPT | Mod: ,,, | Performed by: CLINICAL MEDICAL LABORATORY

## 2022-12-01 PROCEDURE — 99214 PR OFFICE/OUTPT VISIT, EST, LEVL IV, 30-39 MIN: ICD-10-PCS | Mod: ,,, | Performed by: FAMILY MEDICINE

## 2022-12-01 RX ORDER — OMEPRAZOLE 20 MG/1
20 CAPSULE, DELAYED RELEASE ORAL NIGHTLY
Qty: 90 CAPSULE | Refills: 3 | Status: SHIPPED | OUTPATIENT
Start: 2022-12-01 | End: 2023-08-30 | Stop reason: SDUPTHER

## 2022-12-01 RX ORDER — DEXAMETHASONE SODIUM PHOSPHATE 4 MG/ML
4 INJECTION, SOLUTION INTRA-ARTICULAR; INTRALESIONAL; INTRAMUSCULAR; INTRAVENOUS; SOFT TISSUE ONCE
Status: COMPLETED | OUTPATIENT
Start: 2022-12-01 | End: 2022-12-01

## 2022-12-01 RX ORDER — AZITHROMYCIN 250 MG/1
TABLET, FILM COATED ORAL
Qty: 6 TABLET | Refills: 0 | Status: SHIPPED | OUTPATIENT
Start: 2022-12-01 | End: 2023-08-30

## 2022-12-01 RX ORDER — PANTOPRAZOLE SODIUM 40 MG/1
40 TABLET, DELAYED RELEASE ORAL DAILY
Qty: 90 TABLET | Refills: 3 | Status: CANCELLED | OUTPATIENT
Start: 2022-12-01

## 2022-12-01 RX ORDER — PROPRANOLOL HYDROCHLORIDE 40 MG/1
40 TABLET ORAL 2 TIMES DAILY
Qty: 180 TABLET | Refills: 3 | Status: SHIPPED | OUTPATIENT
Start: 2022-12-01 | End: 2023-08-30 | Stop reason: SDUPTHER

## 2022-12-01 RX ADMIN — DEXAMETHASONE SODIUM PHOSPHATE 4 MG: 4 INJECTION, SOLUTION INTRA-ARTICULAR; INTRALESIONAL; INTRAMUSCULAR; INTRAVENOUS; SOFT TISSUE at 01:12

## 2022-12-09 DIAGNOSIS — D72.829 LEUKOCYTOSIS, UNSPECIFIED TYPE: ICD-10-CM

## 2022-12-09 DIAGNOSIS — R53.83 FATIGUE, UNSPECIFIED TYPE: ICD-10-CM

## 2022-12-09 DIAGNOSIS — R79.89 HIGH PLATELET COUNT: Primary | ICD-10-CM

## 2022-12-23 ENCOUNTER — OFFICE VISIT (OUTPATIENT)
Dept: FAMILY MEDICINE | Facility: CLINIC | Age: 77
End: 2022-12-23
Payer: MEDICARE

## 2022-12-23 VITALS
HEIGHT: 62 IN | WEIGHT: 134 LBS | OXYGEN SATURATION: 96 % | BODY MASS INDEX: 24.66 KG/M2 | HEART RATE: 76 BPM | DIASTOLIC BLOOD PRESSURE: 76 MMHG | RESPIRATION RATE: 18 BRPM | SYSTOLIC BLOOD PRESSURE: 120 MMHG | TEMPERATURE: 98 F

## 2022-12-23 DIAGNOSIS — H66.002 NON-RECURRENT ACUTE SUPPURATIVE OTITIS MEDIA OF LEFT EAR WITHOUT SPONTANEOUS RUPTURE OF TYMPANIC MEMBRANE: Primary | ICD-10-CM

## 2022-12-23 DIAGNOSIS — R09.82 POST-NASAL DRAINAGE: ICD-10-CM

## 2022-12-23 PROCEDURE — 99213 OFFICE O/P EST LOW 20 MIN: CPT | Mod: ,,, | Performed by: NURSE PRACTITIONER

## 2022-12-23 PROCEDURE — 99213 PR OFFICE/OUTPT VISIT, EST, LEVL III, 20-29 MIN: ICD-10-PCS | Mod: ,,, | Performed by: NURSE PRACTITIONER

## 2022-12-23 RX ORDER — OFLOXACIN 3 MG/ML
5 SOLUTION AURICULAR (OTIC) DAILY
Qty: 10 ML | Refills: 0 | Status: SHIPPED | OUTPATIENT
Start: 2022-12-23

## 2022-12-23 RX ORDER — PREGABALIN 50 MG/1
50 CAPSULE ORAL
COMMUNITY
Start: 2022-12-06

## 2022-12-23 RX ORDER — CEFDINIR 300 MG/1
300 CAPSULE ORAL 2 TIMES DAILY
Qty: 20 CAPSULE | Refills: 0 | Status: SHIPPED | OUTPATIENT
Start: 2022-12-23 | End: 2023-01-02

## 2022-12-23 NOTE — ASSESSMENT & PLAN NOTE
Cefdinir and Ofloxacin as ordered.      Reviewed pathology of current symptoms, medication side effects/risk/benefits/directions on taking medications, and worsening or persistent symptoms that require follow up in next 2-3 days. Steroid nasal sprays or decongestant use as needed, and Tylenol or Motrin as needed for pain and/or fever and increase fluid intake. Patient was instructed to take antibiotic as directed, complete entire course to avoid antibiotic resistance, and take OTC probiotic with antibiotic to prevent GI upset. All medication benefits and risks discussed with patient. Instructed the patient to return if symptoms persist or worsen.

## 2022-12-23 NOTE — PROGRESS NOTES
Eugenie Granados NP   [facility]  [address]  [place]  [address]      PATIENT NAME: Masoud Major  : 1945  DATE: 22  MRN: [id number]      Billing Provider: Eugenie Granados NP  Level of Service: OK OFFICE/OUTPT VISIT, EST, LEVL III, 20-29 MIN  Patient PCP Information       Provider PCP Type    Charley Holm MD General            Reason for Visit / Chief Complaint: Otalgia (L ear pain X3 days./C/o drainage, starting to hurt on L side of throat.)       Update PCP  Update Chief Complaint         History of Present Illness / Problem Focused Workflow     Masoud Major presents to the clinic with Otalgia (L ear pain X3 days./C/o drainage, starting to hurt on L side of throat.)     77 year old female presents to clinic with complaints of left ear pain x 3 days, sinus drainage, and sore throat. Denies fever.     Review of Systems     @Review of Systems   Constitutional:  Negative for activity change, appetite change, fatigue and fever.   HENT:  Positive for nasal congestion, ear pain, rhinorrhea, sinus pressure/congestion and sore throat.    Eyes:  Negative for pain, redness, visual disturbance and eye dryness.   Respiratory:  Positive for cough. Negative for shortness of breath.    Cardiovascular:  Negative for chest pain and leg swelling.   Gastrointestinal:  Negative for abdominal distention, abdominal pain, constipation and diarrhea.   Endocrine: Negative for cold intolerance, heat intolerance and polyuria.   Genitourinary:  Negative for bladder incontinence, dysuria, frequency and urgency.   Musculoskeletal:  Negative for arthralgias, gait problem and myalgias.   Integumentary:  Negative for color change, rash and wound.   Allergic/Immunologic: Negative for environmental allergies and food allergies.   Neurological:  Negative for dizziness, weakness, light-headedness and headaches.   Psychiatric/Behavioral:  Negative for behavioral problems and sleep disturbance.      Medical  / Social / Family History     Past Medical History:   Diagnosis Date    Abnormal weight loss     Acute bronchitis     Acute superficial gastritis without hemorrhage 9/1/2021    Altered bowel function     Anxiety     Back pain     Belching symptom     Benign hypertension     Cobalamin deficiency     Colon, diverticulosis 6/13/2022    Constipation     Contact dermatitis     Depression     Diarrhea     Disorder of muscle, ligament, and fascia     Dysphagia     Elevated white blood cell count     Encounter for screening colonoscopy 03/22/2016    Essential hypertension     Essential tremor     Excessive weight loss     Fracture of thoracic spine     Frequent urination     Ganglion, right wrist     GERD (gastroesophageal reflux disease)     HH (hiatus hernia) 9/1/2021    History of bone density study 10/26/2020    History of esophagogastroduodenoscopy (EGD) 06/23/2016    History of mammography, screening 03/29/2017    Hyperlipemia     Incomplete emptying of bladder     Insomnia     Multiple joint pain     Osteoarthritis     Osteopenia     Pain, lumbar region     Pap smear for cervical cancer screening 03/2010    Scoliosis     Screening for colon cancer 6/13/2022    Stomach cramps     Subconjunctival hemorrhage     Vitamin D deficiency        Past Surgical History:   Procedure Laterality Date    ADENOIDECTOMY      BREAST BIOPSY      CERVIX LESION DESTRUCTION      CHOLECYSTECTOMY      HYSTERECTOMY      suspensiion of uterus  1965    TONSILLECTOMY      VERTEBROPLASTY  06/06/2013    Dr. alejandro       Social History  Ms.  reports that she has quit smoking. She has been exposed to tobacco smoke. She has never used smokeless tobacco. She reports current alcohol use of about 1.0 standard drink per week. She reports that she does not use drugs.    Family History  Ms.'s family history includes Breast cancer in her maternal aunt, maternal aunt, and sister; Cancer in her sister; Dementia in her mother; Diabetes in her mother; Heart  disease in her mother; Hypertension in her mother.    Medications and Allergies     Medications  Outpatient Medications Marked as Taking for the 12/23/22 encounter (Office Visit) with Eugenie Granados NP   Medication Sig Dispense Refill    B-complex with vitamin C (Z-BEC OR EQUIV) tablet Take 1 tablet by mouth once daily.      cholecalciferol, vitamin D3, 125 mcg (5,000 unit) capsule Take 5,000 Units by mouth once daily.      cyclobenzaprine (FLEXERIL) 10 MG tablet Take 10 mg by mouth 3 (three) times daily as needed.      ipratropium (ATROVENT) 42 mcg (0.06 %) nasal spray 2 sprays by Nasal route 4 (four) times daily. 15 mL 1    omeprazole (PRILOSEC) 20 MG capsule Take 1 capsule (20 mg total) by mouth every evening. 90 capsule 3    pantoprazole (PROTONIX) 40 MG tablet Take 1 tablet (40 mg total) by mouth once daily. 90 tablet 3    pregabalin (LYRICA) 50 MG capsule Take 50 mg by mouth.      propranoloL (INDERAL) 40 MG tablet Take 1 tablet (40 mg total) by mouth 2 (two) times daily. 180 tablet 3    vit C,B-Tc-uexke-lutein-zeaxan (PRESERVISION AREDS-2) 250-90-40-1 mg Cap Take by mouth.         Allergies  Review of patient's allergies indicates:   Allergen Reactions    Augmentin [amoxicillin-pot clavulanate]     Macrobid [nitrofurantoin monohyd/m-cryst]     Sulfa (sulfonamide antibiotics)        Physical Examination     Vitals:    12/23/22 1420   BP: 120/76   Pulse: 76   Resp: 18   Temp: 97.5 °F (36.4 °C)     Physical Exam  Vitals and nursing note reviewed.   HENT:      Head: Normocephalic.      Right Ear: Tympanic membrane normal.      Left Ear: Tympanic membrane is injected, erythematous and bulging.      Nose: Congestion present.      Mouth/Throat:      Mouth: Mucous membranes are moist.      Pharynx: Oropharyngeal exudate (clear post nasal drainage) and posterior oropharyngeal erythema present.   Eyes:      Conjunctiva/sclera: Conjunctivae normal.   Cardiovascular:      Rate and Rhythm: Normal rate and regular  rhythm.      Pulses: Normal pulses.      Heart sounds: Normal heart sounds.   Pulmonary:      Effort: Pulmonary effort is normal.      Breath sounds: Normal breath sounds. No wheezing or rhonchi.   Abdominal:      General: Abdomen is flat. Bowel sounds are normal. There is no distension.      Palpations: Abdomen is soft.   Musculoskeletal:         General: No swelling or tenderness. Normal range of motion.      Cervical back: Normal range of motion.      Right lower leg: No edema.      Left lower leg: No edema.   Skin:     General: Skin is warm and dry.      Capillary Refill: Capillary refill takes less than 2 seconds.   Neurological:      Mental Status: She is alert. Mental status is at baseline.   Psychiatric:         Mood and Affect: Mood normal.         Behavior: Behavior normal.             Lab Results   Component Value Date    WBC 16.52 (H) 12/01/2022    HGB 13.2 12/01/2022    HCT 37.7 (L) 12/01/2022    MCV 91.1 12/01/2022     (H) 12/01/2022          Sodium   Date Value Ref Range Status   12/01/2022 132 (L) 136 - 145 mmol/L Final     Potassium   Date Value Ref Range Status   12/01/2022 4.2 3.5 - 5.1 mmol/L Final     Chloride   Date Value Ref Range Status   12/01/2022 99 98 - 107 mmol/L Final     CO2   Date Value Ref Range Status   12/01/2022 27 21 - 32 mmol/L Final     Glucose   Date Value Ref Range Status   12/01/2022 108 (H) 74 - 106 mg/dL Final     BUN   Date Value Ref Range Status   12/01/2022 22 (H) 7 - 18 mg/dL Final     Creatinine   Date Value Ref Range Status   12/01/2022 0.80 0.55 - 1.02 mg/dL Final     Calcium   Date Value Ref Range Status   12/01/2022 9.1 8.5 - 10.1 mg/dL Final     Total Protein   Date Value Ref Range Status   12/01/2022 6.8 6.4 - 8.2 g/dL Final     Albumin   Date Value Ref Range Status   12/01/2022 3.8 3.5 - 5.0 g/dL Final     Bilirubin, Total   Date Value Ref Range Status   12/01/2022 0.5 >0.0 - 1.2 mg/dL Final     Alk Phos   Date Value Ref Range Status   12/01/2022 119 55  - 142 U/L Final     AST   Date Value Ref Range Status   12/01/2022 24 15 - 37 U/L Final     ALT   Date Value Ref Range Status   12/01/2022 43 13 - 56 U/L Final     Anion Gap   Date Value Ref Range Status   12/01/2022 10 7 - 16 mmol/L Final     eGFR   Date Value Ref Range Status   07/20/2021 62 >=60 mL/min/1.73m² Final      Colonoscopy  Narrative: Procedure Date  6/13/22    Impression  Overall   Impression:  No polyps were seen. Diverticula were present in the   sigmoid and descending colon.    Recommendation  Repeat colonoscopy in 5 years; high fiber diet.    Indication  Family history of colonic polyps in father.    Providers  Attending: Oziel Espino MD  Fellow:    Other Staff: Tierney Vang RN, Royce Andrade CRNA    Medications  Moderate sedation administered by anesthesia staff - See anesthesia   record.    Preprocedure  A history and physical has been performed, and patient medication   allergies have been reviewed. The patient's tolerance of previous   anesthesia has been reviewed. The risks and benefits of the procedure and   the sedation options and risks were discussed with the patient. All   questions were answered and informed consent obtained.    ASA Score: ASA 2 - Patient with mild systemic disease with no functional   limitations  Mallampati Airway Score: II (hard and soft palate, upper portion of   tonsils anduvula visible)    Details of the Procedure  The patient underwent monitored anesthesia care, which was administered by   an anesthesia professional. The patient's heart rate, blood pressure,   level of consciousness, respirations, oxygen, ECG and ETCO2 were monitored   throughout the procedure. A digital rectal exam was performed. The scope   was introduced through the anus and advanced to the cecum. Retroflexion   was performed in the rectum. The quality of bowel preparation was   evaluated using the [place] Bowel Preparation Scale with scores of: right   colon = 2, transverse colon  = 2, left colon = 2. The total BBPS score was   6. Bowel prep was adequate. The patient's estimated blood loss was minimal   (<5 mL). The procedure was not difficult. The patient tolerated the   procedure well. There were no apparent complications.     Scope: Pediatric Colonoscope, Pediatric Colonoscope  Scope Serial: [id number]    Events  Procedure Events   Event Event Time     Procedure Events   Event Event Time   ENDO SCOPE IN TIME 6/13/2022 10:29 AM   ENDO SCOPE OUT TIME 6/13/2022 10:30 AM   ENDO SCOPE IN TIME 6/13/2022 10:36 AM   ENDO CECUM REACHED 6/13/2022 10:46 AM   ENDO SCOPE OUT TIME 6/13/2022 10:51 AM     CECAL WITHDRAWAL TIME: 5m 13s    Findings  Diverticula with no bleeding in the descending colon and sigmoid colon     Procedures   Assessment and Plan (including Health Maintenance)      Problem List  Smart Sets  Document Outside HM   :    Plan:           Problem List Items Addressed This Visit          ENT    Non-recurrent acute suppurative otitis media of left ear without spontaneous rupture of tympanic membrane - Primary    Current Assessment & Plan     Cefdinir and Ofloxacin as ordered.      Reviewed pathology of current symptoms, medication side effects/risk/benefits/directions on taking medications, and worsening or persistent symptoms that require follow up in next 2-3 days. Steroid nasal sprays or decongestant use as needed, and Tylenol or Motrin as needed for pain and/or fever and increase fluid intake. Patient was instructed to take antibiotic as directed, complete entire course to avoid antibiotic resistance, and take OTC probiotic with antibiotic to prevent GI upset. All medication benefits and risks discussed with patient. Instructed the patient to return if symptoms persist or worsen.               Relevant Medications    cefdinir (OMNICEF) 300 MG capsule    ofloxacin (FLOXIN) 0.3 % otic solution     Other Visit Diagnoses       Post-nasal drainage        Relevant Medications     chlorpheniramine-phenylephrine 4-10 mg per tablet            Health Maintenance Topics with due status: Not Due       Topic Last Completion Date    TETANUS VACCINE 01/30/2015    DEXA Scan 05/10/2021    Lipid Panel 12/01/2022       Future Appointments   Date Time Provider Department Center   12/4/2023  8:00 AM Charley Holm MD [facility]        Health Maintenance Due   Topic Date Due    COVID-19 Vaccine (1) Never done    Shingles Vaccine (1 of 2) Never done    Influenza Vaccine (1) 09/01/2022    Colorectal Cancer Screening  12/13/2022        No follow-ups on file.     Signature:  Eugenie Granados NP  [place] [id number] [place]  [address]    Date of encounter: 12/23/22

## 2023-04-03 DIAGNOSIS — I10 PRIMARY HYPERTENSION: ICD-10-CM

## 2023-04-03 RX ORDER — AMLODIPINE BESYLATE 10 MG/1
10 TABLET ORAL NIGHTLY
Qty: 90 TABLET | Refills: 3 | Status: SHIPPED | OUTPATIENT
Start: 2023-04-03 | End: 2024-03-28 | Stop reason: SDUPTHER

## 2023-05-09 DIAGNOSIS — Z71.89 COMPLEX CARE COORDINATION: ICD-10-CM

## 2023-06-30 ENCOUNTER — EXTERNAL CHRONIC CARE MANAGEMENT (OUTPATIENT)
Dept: FAMILY MEDICINE | Facility: CLINIC | Age: 78
End: 2023-06-30
Payer: MEDICARE

## 2023-06-30 PROCEDURE — G0511 CCM/BHI BY RHC/FQHC 20MIN MO: HCPCS | Mod: ,,, | Performed by: FAMILY MEDICINE

## 2023-06-30 PROCEDURE — G0511 PR CHRONIC CARE MGMT, RHC OR FQHC ONLY, 20 MINS OR MORE: ICD-10-PCS | Mod: ,,, | Performed by: FAMILY MEDICINE

## 2023-07-31 ENCOUNTER — EXTERNAL CHRONIC CARE MANAGEMENT (OUTPATIENT)
Dept: FAMILY MEDICINE | Facility: CLINIC | Age: 78
End: 2023-07-31
Payer: MEDICARE

## 2023-07-31 PROCEDURE — G0511 PR CHRONIC CARE MGMT, RHC OR FQHC ONLY, 20 MINS OR MORE: ICD-10-PCS | Mod: ,,, | Performed by: FAMILY MEDICINE

## 2023-07-31 PROCEDURE — G0511 CCM/BHI BY RHC/FQHC 20MIN MO: HCPCS | Mod: ,,, | Performed by: FAMILY MEDICINE

## 2023-08-03 RX ORDER — PANTOPRAZOLE SODIUM 40 MG/1
40 TABLET, DELAYED RELEASE ORAL DAILY
Qty: 90 TABLET | Refills: 3 | Status: SHIPPED | OUTPATIENT
Start: 2023-08-03

## 2023-08-30 ENCOUNTER — OFFICE VISIT (OUTPATIENT)
Dept: FAMILY MEDICINE | Facility: CLINIC | Age: 78
End: 2023-08-30
Payer: MEDICARE

## 2023-08-30 VITALS
WEIGHT: 134 LBS | HEIGHT: 62 IN | HEART RATE: 70 BPM | TEMPERATURE: 98 F | SYSTOLIC BLOOD PRESSURE: 117 MMHG | DIASTOLIC BLOOD PRESSURE: 75 MMHG | BODY MASS INDEX: 24.66 KG/M2

## 2023-08-30 DIAGNOSIS — I10 PRIMARY HYPERTENSION: Primary | Chronic | ICD-10-CM

## 2023-08-30 DIAGNOSIS — G25.0 BENIGN HEAD TREMOR: Chronic | ICD-10-CM

## 2023-08-30 DIAGNOSIS — I70.0 AORTIC ATHEROSCLEROSIS: Chronic | ICD-10-CM

## 2023-08-30 DIAGNOSIS — K21.9 GASTROESOPHAGEAL REFLUX DISEASE, UNSPECIFIED WHETHER ESOPHAGITIS PRESENT: Chronic | ICD-10-CM

## 2023-08-30 DIAGNOSIS — R79.89 HIGH PLATELET COUNT: Chronic | ICD-10-CM

## 2023-08-30 DIAGNOSIS — R05.9 COUGH, UNSPECIFIED TYPE: ICD-10-CM

## 2023-08-30 DIAGNOSIS — E55.9 VITAMIN D DEFICIENCY: Chronic | ICD-10-CM

## 2023-08-30 LAB
25(OH)D3 SERPL-MCNC: 36.6 NG/ML
ALBUMIN SERPL BCP-MCNC: 3.5 G/DL (ref 3.5–5)
ALBUMIN/GLOB SERPL: 1.2 {RATIO}
ALP SERPL-CCNC: 125 U/L (ref 55–142)
ALT SERPL W P-5'-P-CCNC: 29 U/L (ref 13–56)
ANION GAP SERPL CALCULATED.3IONS-SCNC: 11 MMOL/L (ref 7–16)
AST SERPL W P-5'-P-CCNC: 20 U/L (ref 15–37)
BASOPHILS # BLD AUTO: 0.06 K/UL (ref 0–0.2)
BASOPHILS NFR BLD AUTO: 0.8 % (ref 0–1)
BILIRUB SERPL-MCNC: 0.4 MG/DL (ref ?–1.2)
BILIRUB UR QL STRIP: NEGATIVE
BUN SERPL-MCNC: 17 MG/DL (ref 7–18)
BUN/CREAT SERPL: 19 (ref 6–20)
CALCIUM SERPL-MCNC: 8.9 MG/DL (ref 8.5–10.1)
CHLORIDE SERPL-SCNC: 98 MMOL/L (ref 98–107)
CHOLEST SERPL-MCNC: 247 MG/DL (ref 0–200)
CHOLEST/HDLC SERPL: 5.7 {RATIO}
CLARITY UR: CLEAR
CO2 SERPL-SCNC: 27 MMOL/L (ref 21–32)
COLOR UR: NORMAL
CREAT SERPL-MCNC: 0.88 MG/DL (ref 0.55–1.02)
DIFFERENTIAL METHOD BLD: ABNORMAL
EGFR (NO RACE VARIABLE) (RUSH/TITUS): 68 ML/MIN/1.73M2
EOSINOPHIL # BLD AUTO: 0.13 K/UL (ref 0–0.5)
EOSINOPHIL NFR BLD AUTO: 1.7 % (ref 1–4)
ERYTHROCYTE [DISTWIDTH] IN BLOOD BY AUTOMATED COUNT: 12.6 % (ref 11.5–14.5)
GLOBULIN SER-MCNC: 3 G/DL (ref 2–4)
GLUCOSE SERPL-MCNC: 86 MG/DL (ref 74–106)
GLUCOSE UR STRIP-MCNC: NORMAL MG/DL
HCT VFR BLD AUTO: 35.4 % (ref 38–47)
HDLC SERPL-MCNC: 43 MG/DL (ref 40–60)
HGB BLD-MCNC: 12.4 G/DL (ref 12–16)
IMM GRANULOCYTES # BLD AUTO: 0.04 K/UL (ref 0–0.04)
IMM GRANULOCYTES NFR BLD: 0.5 % (ref 0–0.4)
KETONES UR STRIP-SCNC: NEGATIVE MG/DL
LEUKOCYTE ESTERASE UR QL STRIP: NEGATIVE
LYMPHOCYTES # BLD AUTO: 2.06 K/UL (ref 1–4.8)
LYMPHOCYTES NFR BLD AUTO: 26.9 % (ref 27–41)
MCH RBC QN AUTO: 31.4 PG (ref 27–31)
MCHC RBC AUTO-ENTMCNC: 35 G/DL (ref 32–36)
MCV RBC AUTO: 89.6 FL (ref 80–96)
MONOCYTES # BLD AUTO: 0.8 K/UL (ref 0–0.8)
MONOCYTES NFR BLD AUTO: 10.5 % (ref 2–6)
MPC BLD CALC-MCNC: 8.6 FL (ref 9.4–12.4)
NEUTROPHILS # BLD AUTO: 4.56 K/UL (ref 1.8–7.7)
NEUTROPHILS NFR BLD AUTO: 59.6 % (ref 53–65)
NITRITE UR QL STRIP: NEGATIVE
NONHDLC SERPL-MCNC: 204 MG/DL
NRBC # BLD AUTO: 0 X10E3/UL
NRBC, AUTO (.00): 0 %
PH UR STRIP: 6 PH UNITS
PLATELET # BLD AUTO: 496 K/UL (ref 150–400)
POTASSIUM SERPL-SCNC: 3.2 MMOL/L (ref 3.5–5.1)
PROT SERPL-MCNC: 6.5 G/DL (ref 6.4–8.2)
PROT UR QL STRIP: NEGATIVE
RBC # BLD AUTO: 3.95 M/UL (ref 4.2–5.4)
RBC # UR STRIP: NEGATIVE /UL
SODIUM SERPL-SCNC: 133 MMOL/L (ref 136–145)
SP GR UR STRIP: 1.01
TRIGL SERPL-MCNC: 509 MG/DL (ref 35–150)
TSH SERPL DL<=0.005 MIU/L-ACNC: 1.95 UIU/ML (ref 0.36–3.74)
UROBILINOGEN UR STRIP-ACNC: NORMAL MG/DL
VLDLC SERPL-MCNC: ABNORMAL MG/DL
WBC # BLD AUTO: 7.65 K/UL (ref 4.5–11)

## 2023-08-30 PROCEDURE — 96372 THER/PROPH/DIAG INJ SC/IM: CPT | Mod: ,,, | Performed by: FAMILY MEDICINE

## 2023-08-30 PROCEDURE — 85025 COMPLETE CBC W/AUTO DIFF WBC: CPT | Mod: ,,, | Performed by: CLINICAL MEDICAL LABORATORY

## 2023-08-30 PROCEDURE — 80061 LIPID PANEL: CPT | Mod: ,,, | Performed by: CLINICAL MEDICAL LABORATORY

## 2023-08-30 PROCEDURE — 84443 TSH: ICD-10-PCS | Mod: ,,, | Performed by: CLINICAL MEDICAL LABORATORY

## 2023-08-30 PROCEDURE — 81003 URINALYSIS, REFLEX TO URINE CULTURE: ICD-10-PCS | Mod: QW,,, | Performed by: CLINICAL MEDICAL LABORATORY

## 2023-08-30 PROCEDURE — 99214 OFFICE O/P EST MOD 30 MIN: CPT | Mod: ,,, | Performed by: FAMILY MEDICINE

## 2023-08-30 PROCEDURE — 99214 PR OFFICE/OUTPT VISIT, EST, LEVL IV, 30-39 MIN: ICD-10-PCS | Mod: ,,, | Performed by: FAMILY MEDICINE

## 2023-08-30 PROCEDURE — 80061 LIPID PANEL: ICD-10-PCS | Mod: ,,, | Performed by: CLINICAL MEDICAL LABORATORY

## 2023-08-30 PROCEDURE — 82306 VITAMIN D 25 HYDROXY: CPT | Mod: ,,, | Performed by: CLINICAL MEDICAL LABORATORY

## 2023-08-30 PROCEDURE — 81003 URINALYSIS AUTO W/O SCOPE: CPT | Mod: QW,,, | Performed by: CLINICAL MEDICAL LABORATORY

## 2023-08-30 PROCEDURE — 85025 CBC WITH DIFFERENTIAL: ICD-10-PCS | Mod: ,,, | Performed by: CLINICAL MEDICAL LABORATORY

## 2023-08-30 PROCEDURE — 80053 COMPREHEN METABOLIC PANEL: CPT | Mod: ,,, | Performed by: CLINICAL MEDICAL LABORATORY

## 2023-08-30 PROCEDURE — 96372 PR INJECTION,THERAP/PROPH/DIAG2ST, IM OR SUBCUT: ICD-10-PCS | Mod: ,,, | Performed by: FAMILY MEDICINE

## 2023-08-30 PROCEDURE — 84443 ASSAY THYROID STIM HORMONE: CPT | Mod: ,,, | Performed by: CLINICAL MEDICAL LABORATORY

## 2023-08-30 PROCEDURE — 82306 VITAMIN D: ICD-10-PCS | Mod: ,,, | Performed by: CLINICAL MEDICAL LABORATORY

## 2023-08-30 PROCEDURE — 80053 COMPREHENSIVE METABOLIC PANEL: ICD-10-PCS | Mod: ,,, | Performed by: CLINICAL MEDICAL LABORATORY

## 2023-08-30 RX ORDER — NEOMYCIN SULFATE, POLYMYXIN B SULFATE AND DEXAMETHASONE 3.5; 10000; 1 MG/ML; [USP'U]/ML; MG/ML
1 SUSPENSION/ DROPS OPHTHALMIC 3 TIMES DAILY
COMMUNITY
Start: 2023-06-12

## 2023-08-30 RX ORDER — TRIAMTERENE/HYDROCHLOROTHIAZID 37.5-25 MG
1 TABLET ORAL DAILY PRN
Qty: 90 TABLET | Refills: 3 | Status: SHIPPED | OUTPATIENT
Start: 2023-08-30

## 2023-08-30 RX ORDER — OMEPRAZOLE 20 MG/1
20 CAPSULE, DELAYED RELEASE ORAL NIGHTLY
Qty: 90 CAPSULE | Refills: 3 | Status: SHIPPED | OUTPATIENT
Start: 2023-08-30 | End: 2023-12-20 | Stop reason: SDUPTHER

## 2023-08-30 RX ORDER — DEXAMETHASONE SODIUM PHOSPHATE 4 MG/ML
4 INJECTION, SOLUTION INTRA-ARTICULAR; INTRALESIONAL; INTRAMUSCULAR; INTRAVENOUS; SOFT TISSUE ONCE
Status: COMPLETED | OUTPATIENT
Start: 2023-08-30 | End: 2023-08-30

## 2023-08-30 RX ORDER — ONDANSETRON 4 MG/1
4 TABLET, FILM COATED ORAL EVERY 8 HOURS PRN
COMMUNITY
Start: 2023-06-05

## 2023-08-30 RX ORDER — MUPIROCIN 20 MG/G
OINTMENT TOPICAL 2 TIMES DAILY
COMMUNITY
Start: 2023-04-24

## 2023-08-30 RX ORDER — PROPRANOLOL HYDROCHLORIDE 40 MG/1
40 TABLET ORAL 2 TIMES DAILY
Qty: 180 TABLET | Refills: 3 | Status: SHIPPED | OUTPATIENT
Start: 2023-08-30 | End: 2023-12-21 | Stop reason: SDUPTHER

## 2023-08-30 RX ADMIN — DEXAMETHASONE SODIUM PHOSPHATE 4 MG: 4 INJECTION, SOLUTION INTRA-ARTICULAR; INTRALESIONAL; INTRAMUSCULAR; INTRAVENOUS; SOFT TISSUE at 11:08

## 2023-08-30 NOTE — PROGRESS NOTES
Subjective     Patient ID: Masoud Major is a 78 y.o. female.    Chief Complaint: Annual Exam, Cough, and Medication Refill    77 yo WF here for yearly check up and lab. Getting spinal stimulator soon. Cough for 2 weeks. No fever or sputum. Sneezing.       Review of Systems   Constitutional:  Negative for activity change.   HENT:  Positive for nasal congestion and sneezing.    Eyes:  Negative for visual disturbance.   Respiratory:  Positive for cough. Negative for shortness of breath.    Cardiovascular:  Negative for chest pain.   Gastrointestinal:  Negative for abdominal pain.   Neurological:  Negative for headaches.   Psychiatric/Behavioral:  Negative for dysphoric mood.      Office Visit on 08/30/2023   Component Date Value Ref Range Status    Color, UA 08/30/2023 Light-Yellow  Colorless, Straw, Yellow, Light Yellow, Dark Yellow Final    Clarity, UA 08/30/2023 Clear  Clear Final    pH, UA 08/30/2023 6.0  5.0 to 8.0 pH Units Final    Leukocytes, UA 08/30/2023 Negative  Negative Final    Nitrites, UA 08/30/2023 Negative  Negative Final    Protein, UA 08/30/2023 Negative  Negative Final    Glucose, UA 08/30/2023 Normal  Normal mg/dL Final    Ketones, UA 08/30/2023 Negative  Negative mg/dL Final    Urobilinogen, UA 08/30/2023 Normal  0.2, 1.0, Normal mg/dL Final    Bilirubin, UA 08/30/2023 Negative  Negative Final    Blood, UA 08/30/2023 Negative  Negative Final    Specific Gravity, UA 08/30/2023 1.013  <=1.030 Final    Sodium 08/30/2023 133 (L)  136 - 145 mmol/L Final    Potassium 08/30/2023 3.2 (L)  3.5 - 5.1 mmol/L Final    Chloride 08/30/2023 98  98 - 107 mmol/L Final    CO2 08/30/2023 27  21 - 32 mmol/L Final    Anion Gap 08/30/2023 11  7 - 16 mmol/L Final    Glucose 08/30/2023 86  74 - 106 mg/dL Final    BUN 08/30/2023 17  7 - 18 mg/dL Final    Creatinine 08/30/2023 0.88  0.55 - 1.02 mg/dL Final    BUN/Creatinine Ratio 08/30/2023 19  6 - 20 Final    Calcium 08/30/2023 8.9  8.5 - 10.1 mg/dL Final    Total  Protein 08/30/2023 6.5  6.4 - 8.2 g/dL Final    Albumin 08/30/2023 3.5  3.5 - 5.0 g/dL Final    Globulin 08/30/2023 3.0  2.0 - 4.0 g/dL Final    A/G Ratio 08/30/2023 1.2   Final    Bilirubin, Total 08/30/2023 0.4  >0.0 - 1.2 mg/dL Final    Alk Phos 08/30/2023 125  55 - 142 U/L Final    ALT 08/30/2023 29  13 - 56 U/L Final    AST 08/30/2023 20  15 - 37 U/L Final    eGFR 08/30/2023 68  >=60 mL/min/1.73m2 Final    Triglycerides 08/30/2023 509 (H)  35 - 150 mg/dL Final      Normal:  <150 mg/dL  Borderline High: 150-199 mg/dL  High:   200-499 mg/dL  Very High:  >=500    Cholesterol 08/30/2023 247 (H)  0 - 200 mg/dL Final      <200 mg/dL:  Desirable  200-240 mg/dL: Borderline High  >240 mg/dL:  High    HDL Cholesterol 08/30/2023 43  40 - 60 mg/dL Final      <40 mg/dL: Low HDL  40-60 mg/dL: Normal  >60 mg/dL: Desirable    Cholesterol/HDL Ratio (Risk Factor) 08/30/2023 5.7   Final    Non-HDL 08/30/2023 204  mg/dL Final    VLDL 08/30/2023    Final    Calculation invalid due to Triglyceride value > 400    TSH 08/30/2023 1.950  0.358 - 3.740 uIU/mL Final    Vitamin D 25-Hydroxy, Blood 08/30/2023 36.6  ng/mL Final    Vitamin D 25-OH Adult Reference Values:  Deficiency: <20 ng/mL  Insufficiency: 20 - <30 ng/mL  Sufficiency: 30 -100 ng/mL    Vitamin D 25-OH Pediatric Reference Values:  Deficiency: <15 ng/mL  Insufficiency: 15 - <20 ng/mL  Sufficiency: 20 - 100 ng/mL    WBC 08/30/2023 7.65  4.50 - 11.00 K/uL Final    RBC 08/30/2023 3.95 (L)  4.20 - 5.40 M/uL Final    Hemoglobin 08/30/2023 12.4  12.0 - 16.0 g/dL Final    Hematocrit 08/30/2023 35.4 (L)  38.0 - 47.0 % Final    MCV 08/30/2023 89.6  80.0 - 96.0 fL Final    MCH 08/30/2023 31.4 (H)  27.0 - 31.0 pg Final    MCHC 08/30/2023 35.0  32.0 - 36.0 g/dL Final    RDW 08/30/2023 12.6  11.5 - 14.5 % Final    Platelet Count 08/30/2023 496 (H)  150 - 400 K/uL Final    MPV 08/30/2023 8.6 (L)  9.4 - 12.4 fL Final    Neutrophils % 08/30/2023 59.6  53.0 - 65.0 % Final    Lymphocytes %  "08/30/2023 26.9 (L)  27.0 - 41.0 % Final    Monocytes % 08/30/2023 10.5 (H)  2.0 - 6.0 % Final    Eosinophils % 08/30/2023 1.7  1.0 - 4.0 % Final    Basophils % 08/30/2023 0.8  0.0 - 1.0 % Final    Immature Granulocytes % 08/30/2023 0.5 (H)  0.0 - 0.4 % Final    nRBC, Auto 08/30/2023 0.0  <=0.0 % Final    Neutrophils, Abs 08/30/2023 4.56  1.80 - 7.70 K/uL Final    Lymphocytes, Absolute 08/30/2023 2.06  1.00 - 4.80 K/uL Final    Monocytes, Absolute 08/30/2023 0.80  0.00 - 0.80 K/uL Final    Eosinophils, Absolute 08/30/2023 0.13  0.00 - 0.50 K/uL Final    Basophils, Absolute 08/30/2023 0.06  0.00 - 0.20 K/uL Final    Immature Granulocytes, Absolute 08/30/2023 0.04  0.00 - 0.04 K/uL Final    nRBC, Absolute 08/30/2023 0.00  <=0.00 x10e3/uL Final    Diff Type 08/30/2023 Auto   Final          Objective   Blood pressure 117/75, pulse 70, temperature 97.6 °F (36.4 °C), temperature source Oral, height 5' 2" (1.575 m), weight 60.8 kg (134 lb).    Physical Exam  Constitutional:       Appearance: Normal appearance.   HENT:      Head: Normocephalic and atraumatic.      Right Ear: External ear normal.      Left Ear: External ear normal.      Nose: Nose normal.      Mouth/Throat:      Mouth: Mucous membranes are moist.   Eyes:      Conjunctiva/sclera: Conjunctivae normal.      Pupils: Pupils are equal, round, and reactive to light.   Cardiovascular:      Rate and Rhythm: Normal rate and regular rhythm.      Pulses: Normal pulses.      Heart sounds: Normal heart sounds.   Pulmonary:      Effort: Pulmonary effort is normal.      Breath sounds: Normal breath sounds.   Abdominal:      General: Abdomen is flat.      Palpations: Abdomen is soft.   Musculoskeletal:         General: Normal range of motion.      Cervical back: Normal range of motion and neck supple.   Skin:     General: Skin is warm and dry.   Neurological:      General: No focal deficit present.      Mental Status: She is alert and oriented to person, place, and time. "   Psychiatric:         Mood and Affect: Mood normal.         Behavior: Behavior normal.         Thought Content: Thought content normal.         Judgment: Judgment normal.            Assessment and Plan     1. Primary hypertension  -     CBC Auto Differential; Future; Expected date: 08/30/2023  -     Comprehensive Metabolic Panel; Future; Expected date: 08/30/2023  -     Lipid Panel; Future; Expected date: 08/31/2023  -     TSH; Future; Expected date: 08/30/2023  -     Urinalysis, Reflex to Urine Culture  -     triamterene-hydrochlorothiazide 37.5-25 mg (MAXZIDE-25) 37.5-25 mg per tablet; Take 1 tablet by mouth daily as needed (edema). 1/2 to 1 po daily PRN edema  Dispense: 90 tablet; Refill: 3    2. High platelet count  -     CBC Auto Differential; Future; Expected date: 08/30/2023  -     Comprehensive Metabolic Panel; Future; Expected date: 08/30/2023  -     Lipid Panel; Future; Expected date: 08/31/2023  -     TSH; Future; Expected date: 08/30/2023  -     Urinalysis, Reflex to Urine Culture    3. Vitamin D deficiency  -     Vitamin D; Future; Expected date: 08/30/2023    4. Benign head tremor  -     propranoloL (INDERAL) 40 MG tablet; Take 1 tablet (40 mg total) by mouth 2 (two) times daily.  Dispense: 180 tablet; Refill: 3    5. Gastroesophageal reflux disease, unspecified whether esophagitis present  -     omeprazole (PRILOSEC) 20 MG capsule; Take 1 capsule (20 mg total) by mouth every evening.  Dispense: 90 capsule; Refill: 3    6. Cough, unspecified type  -     dexAMETHasone injection 4 mg    7. Aortic atherosclerosis        RTC prn.          Follow up in about 1 year (around 8/30/2024) for for check up / cancel December apt..

## 2023-08-31 ENCOUNTER — EXTERNAL CHRONIC CARE MANAGEMENT (OUTPATIENT)
Dept: FAMILY MEDICINE | Facility: CLINIC | Age: 78
End: 2023-08-31
Payer: MEDICARE

## 2023-08-31 PROCEDURE — G0511 PR CHRONIC CARE MGMT, RHC OR FQHC ONLY, 20 MINS OR MORE: ICD-10-PCS | Mod: ,,, | Performed by: FAMILY MEDICINE

## 2023-08-31 PROCEDURE — G0511 CCM/BHI BY RHC/FQHC 20MIN MO: HCPCS | Mod: ,,, | Performed by: FAMILY MEDICINE

## 2023-09-05 NOTE — PROGRESS NOTES
Pt made aware, voiced understanding and wants to have potassium rx sent to [facility].  She has been taking a whole fluid pill more often with the warmer weather causing more swelling.

## 2023-09-08 DIAGNOSIS — E87.6 HYPOKALEMIA: Primary | ICD-10-CM

## 2023-09-08 RX ORDER — POTASSIUM CHLORIDE 750 MG/1
10 CAPSULE, EXTENDED RELEASE ORAL 2 TIMES DAILY
Qty: 180 CAPSULE | Refills: 3 | Status: SHIPPED | OUTPATIENT
Start: 2023-09-08

## 2023-09-15 ENCOUNTER — TELEPHONE (OUTPATIENT)
Dept: FAMILY MEDICINE | Facility: CLINIC | Age: 78
End: 2023-09-15
Payer: MEDICARE

## 2023-09-15 NOTE — TELEPHONE ENCOUNTER
Pt called stated she cannot take the potassium.  Stated it causes nausea and vomiting. She is having leg cramps and asked what else you suggest for her?

## 2023-09-21 NOTE — TELEPHONE ENCOUNTER
Pt has cut back to a half a tablet of her diuretic already and will talk with her pharmacist to see what he suggests too.  She will let us know if she wants to go with another rx.

## 2023-09-30 ENCOUNTER — EXTERNAL CHRONIC CARE MANAGEMENT (OUTPATIENT)
Dept: FAMILY MEDICINE | Facility: CLINIC | Age: 78
End: 2023-09-30
Payer: MEDICARE

## 2023-09-30 PROCEDURE — G0511 PR CHRONIC CARE MGMT, RHC OR FQHC ONLY, 20 MINS OR MORE: ICD-10-PCS | Mod: ,,, | Performed by: FAMILY MEDICINE

## 2023-09-30 PROCEDURE — G0511 CCM/BHI BY RHC/FQHC 20MIN MO: HCPCS | Mod: ,,, | Performed by: FAMILY MEDICINE

## 2023-10-22 PROBLEM — E55.9 VITAMIN D DEFICIENCY: Chronic | Status: ACTIVE | Noted: 2023-10-22

## 2023-10-22 PROBLEM — R79.89 HIGH PLATELET COUNT: Chronic | Status: ACTIVE | Noted: 2023-10-22

## 2023-10-22 PROBLEM — I70.0 AORTIC ATHEROSCLEROSIS: Status: ACTIVE | Noted: 2023-10-22

## 2023-10-22 PROBLEM — I10 PRIMARY HYPERTENSION: Chronic | Status: ACTIVE | Noted: 2023-10-22

## 2023-10-31 ENCOUNTER — EXTERNAL CHRONIC CARE MANAGEMENT (OUTPATIENT)
Dept: FAMILY MEDICINE | Facility: CLINIC | Age: 78
End: 2023-10-31
Payer: MEDICARE

## 2023-10-31 PROCEDURE — G0511 CCM/BHI BY RHC/FQHC 20MIN MO: HCPCS | Mod: ,,, | Performed by: FAMILY MEDICINE

## 2023-10-31 PROCEDURE — G0511 PR CHRONIC CARE MGMT, RHC OR FQHC ONLY, 20 MINS OR MORE: ICD-10-PCS | Mod: ,,, | Performed by: FAMILY MEDICINE

## 2023-11-30 ENCOUNTER — EXTERNAL CHRONIC CARE MANAGEMENT (OUTPATIENT)
Dept: FAMILY MEDICINE | Facility: CLINIC | Age: 78
End: 2023-11-30
Payer: MEDICARE

## 2023-11-30 PROCEDURE — G0511 PR CHRONIC CARE MGMT, RHC OR FQHC ONLY, 20 MINS OR MORE: ICD-10-PCS | Mod: ,,, | Performed by: FAMILY MEDICINE

## 2023-11-30 PROCEDURE — G0511 CCM/BHI BY RHC/FQHC 20MIN MO: HCPCS | Mod: ,,, | Performed by: FAMILY MEDICINE

## 2023-12-20 DIAGNOSIS — K21.9 GASTROESOPHAGEAL REFLUX DISEASE, UNSPECIFIED WHETHER ESOPHAGITIS PRESENT: Chronic | ICD-10-CM

## 2023-12-20 RX ORDER — OMEPRAZOLE 20 MG/1
20 CAPSULE, DELAYED RELEASE ORAL NIGHTLY
Qty: 90 CAPSULE | Refills: 3 | Status: SHIPPED | OUTPATIENT
Start: 2023-12-20 | End: 2024-12-19

## 2023-12-21 DIAGNOSIS — G25.0 BENIGN HEAD TREMOR: Chronic | ICD-10-CM

## 2023-12-21 RX ORDER — PROPRANOLOL HYDROCHLORIDE 40 MG/1
40 TABLET ORAL 2 TIMES DAILY
Qty: 180 TABLET | Refills: 3 | Status: SHIPPED | OUTPATIENT
Start: 2023-12-21 | End: 2024-12-20

## 2023-12-31 ENCOUNTER — EXTERNAL CHRONIC CARE MANAGEMENT (OUTPATIENT)
Dept: FAMILY MEDICINE | Facility: CLINIC | Age: 78
End: 2023-12-31
Payer: MEDICARE

## 2023-12-31 PROCEDURE — G0511 CCM/BHI BY RHC/FQHC 20MIN MO: HCPCS | Mod: ,,, | Performed by: FAMILY MEDICINE

## 2024-01-31 ENCOUNTER — EXTERNAL CHRONIC CARE MANAGEMENT (OUTPATIENT)
Dept: FAMILY MEDICINE | Facility: CLINIC | Age: 79
End: 2024-01-31
Payer: MEDICARE

## 2024-01-31 PROCEDURE — G0511 CCM/BHI BY RHC/FQHC 20MIN MO: HCPCS | Mod: ,,, | Performed by: FAMILY MEDICINE

## 2024-02-23 DIAGNOSIS — G25.0 BENIGN HEAD TREMOR: Chronic | ICD-10-CM

## 2024-02-23 DIAGNOSIS — E87.6 HYPOKALEMIA: ICD-10-CM

## 2024-02-23 DIAGNOSIS — K21.9 GASTROESOPHAGEAL REFLUX DISEASE, UNSPECIFIED WHETHER ESOPHAGITIS PRESENT: Chronic | ICD-10-CM

## 2024-02-23 DIAGNOSIS — I10 PRIMARY HYPERTENSION: ICD-10-CM

## 2024-02-23 RX ORDER — AMLODIPINE BESYLATE 10 MG/1
10 TABLET ORAL NIGHTLY
Qty: 90 TABLET | Refills: 3 | Status: CANCELLED | OUTPATIENT
Start: 2024-02-23 | End: 2024-05-23

## 2024-02-23 RX ORDER — OMEPRAZOLE 20 MG/1
20 CAPSULE, DELAYED RELEASE ORAL NIGHTLY
Qty: 90 CAPSULE | Refills: 3 | Status: CANCELLED | OUTPATIENT
Start: 2024-02-23 | End: 2025-02-22

## 2024-02-23 RX ORDER — TRIAMTERENE/HYDROCHLOROTHIAZID 37.5-25 MG
1 TABLET ORAL DAILY PRN
Qty: 90 TABLET | Refills: 3 | Status: CANCELLED | OUTPATIENT
Start: 2024-02-23

## 2024-02-23 RX ORDER — PANTOPRAZOLE SODIUM 40 MG/1
40 TABLET, DELAYED RELEASE ORAL DAILY
Qty: 90 TABLET | Refills: 3 | Status: CANCELLED | OUTPATIENT
Start: 2024-02-23

## 2024-02-23 RX ORDER — POTASSIUM CHLORIDE 750 MG/1
10 CAPSULE, EXTENDED RELEASE ORAL 2 TIMES DAILY
Qty: 180 CAPSULE | Refills: 3 | Status: CANCELLED | OUTPATIENT
Start: 2024-02-23

## 2024-02-23 RX ORDER — PREGABALIN 50 MG/1
50 CAPSULE ORAL
Status: CANCELLED | OUTPATIENT
Start: 2024-02-23

## 2024-02-23 RX ORDER — PROPRANOLOL HYDROCHLORIDE 40 MG/1
40 TABLET ORAL 2 TIMES DAILY
Qty: 180 TABLET | Refills: 3 | Status: CANCELLED | OUTPATIENT
Start: 2024-02-23 | End: 2025-02-22

## 2024-02-29 ENCOUNTER — EXTERNAL CHRONIC CARE MANAGEMENT (OUTPATIENT)
Dept: FAMILY MEDICINE | Facility: CLINIC | Age: 79
End: 2024-02-29
Payer: MEDICARE

## 2024-02-29 PROCEDURE — G0511 CCM/BHI BY RHC/FQHC 20MIN MO: HCPCS | Mod: ,,, | Performed by: FAMILY MEDICINE

## 2024-03-28 DIAGNOSIS — I10 PRIMARY HYPERTENSION: ICD-10-CM

## 2024-03-28 RX ORDER — AMLODIPINE BESYLATE 10 MG/1
10 TABLET ORAL NIGHTLY
Qty: 90 TABLET | Refills: 3 | Status: SHIPPED | OUTPATIENT
Start: 2024-03-28

## 2024-03-31 ENCOUNTER — EXTERNAL CHRONIC CARE MANAGEMENT (OUTPATIENT)
Dept: FAMILY MEDICINE | Facility: CLINIC | Age: 79
End: 2024-03-31
Payer: MEDICARE

## 2024-03-31 PROCEDURE — G0511 CCM/BHI BY RHC/FQHC 20MIN MO: HCPCS | Mod: ,,, | Performed by: FAMILY MEDICINE

## 2024-04-08 ENCOUNTER — OFFICE VISIT (OUTPATIENT)
Dept: FAMILY MEDICINE | Facility: CLINIC | Age: 79
End: 2024-04-08
Payer: MEDICARE

## 2024-04-08 VITALS
RESPIRATION RATE: 18 BRPM | BODY MASS INDEX: 23.49 KG/M2 | HEART RATE: 67 BPM | DIASTOLIC BLOOD PRESSURE: 76 MMHG | OXYGEN SATURATION: 97 % | TEMPERATURE: 98 F | HEIGHT: 62 IN | SYSTOLIC BLOOD PRESSURE: 120 MMHG | WEIGHT: 127.63 LBS

## 2024-04-08 DIAGNOSIS — M85.80 OSTEOPENIA AFTER MENOPAUSE: ICD-10-CM

## 2024-04-08 DIAGNOSIS — Z78.0 OSTEOPENIA AFTER MENOPAUSE: ICD-10-CM

## 2024-04-08 DIAGNOSIS — R09.81 HEAD CONGESTION: ICD-10-CM

## 2024-04-08 DIAGNOSIS — H11.32 SUBCONJUNCTIVAL HEMORRHAGE OF LEFT EYE: ICD-10-CM

## 2024-04-08 DIAGNOSIS — J32.4 PANSINUSITIS, UNSPECIFIED CHRONICITY: Primary | ICD-10-CM

## 2024-04-08 PROCEDURE — 99213 OFFICE O/P EST LOW 20 MIN: CPT | Mod: ,,, | Performed by: NURSE PRACTITIONER

## 2024-04-08 PROCEDURE — 96372 THER/PROPH/DIAG INJ SC/IM: CPT | Mod: ,,, | Performed by: NURSE PRACTITIONER

## 2024-04-08 RX ORDER — AZITHROMYCIN 250 MG/1
TABLET, FILM COATED ORAL
Qty: 6 TABLET | Refills: 0 | Status: SHIPPED | OUTPATIENT
Start: 2024-04-08 | End: 2024-04-13

## 2024-04-08 RX ORDER — DEXAMETHASONE SODIUM PHOSPHATE 4 MG/ML
4 INJECTION, SOLUTION INTRA-ARTICULAR; INTRALESIONAL; INTRAMUSCULAR; INTRAVENOUS; SOFT TISSUE
Status: COMPLETED | OUTPATIENT
Start: 2024-04-08 | End: 2024-04-08

## 2024-04-08 RX ORDER — GUAIFENESIN 600 MG/1
1200 TABLET, EXTENDED RELEASE ORAL 2 TIMES DAILY
Qty: 20 TABLET | Refills: 0 | Status: SHIPPED | OUTPATIENT
Start: 2024-04-08

## 2024-04-08 RX ORDER — CEFTRIAXONE 1 G/1
1 INJECTION, POWDER, FOR SOLUTION INTRAMUSCULAR; INTRAVENOUS
Status: COMPLETED | OUTPATIENT
Start: 2024-04-08 | End: 2024-04-08

## 2024-04-08 RX ADMIN — DEXAMETHASONE SODIUM PHOSPHATE 4 MG: 4 INJECTION, SOLUTION INTRA-ARTICULAR; INTRALESIONAL; INTRAMUSCULAR; INTRAVENOUS; SOFT TISSUE at 10:04

## 2024-04-08 RX ADMIN — CEFTRIAXONE 1 G: 1 INJECTION, POWDER, FOR SOLUTION INTRAMUSCULAR; INTRAVENOUS at 10:04

## 2024-04-08 NOTE — ASSESSMENT & PLAN NOTE
Head congestion and sinus pressure  Declines covid/flu testing  Discussed risks/benefits/alternatives for treatment  Rocephin, decadron IM  Zithromax po   Rest. Increase fluids as tolerated

## 2024-04-08 NOTE — ASSESSMENT & PLAN NOTE
Redness to inner canthus of left eye for several days  Noticed redness after she started coughing  Denies any pain, pressure, diplopia or decreased VA  Advised her if area worsens or change in vision to follow up with optometry

## 2024-04-08 NOTE — PROGRESS NOTES
RAYMUNDO Regan   [facility]  [id number] [address]  [phone]      PATIENT NAME: Masoud Major  : 1945  DATE: 24  MRN: [id number]      Billing Provider: RAYMUNDO Regan  Level of Service: [place] [facility]  Patient PCP Information       Provider PCP Type    Charley Holm MD General            Reason for Visit / Chief Complaint: Cough (Dry cough started Saturday ), Nasal Congestion (Started Saturday ), and Headache (Started Saturday )       Update PCP  Update Chief Complaint         History of Present Illness / Problem Focused Workflow     79 year old female presents with complaints of cough, congestion, sinus pressure  Started about 3 days ago  Declines covid flu testing       Review of Systems     Review of Systems   Constitutional:  Positive for fatigue. Negative for fever.   HENT:  Positive for congestion, sinus pressure, sinus pain and sore throat. Negative for ear pain.    Eyes:  Positive for redness. Negative for photophobia, pain and visual disturbance.   Respiratory:  Positive for cough. Negative for shortness of breath.    Cardiovascular:  Negative for palpitations.   Gastrointestinal:  Negative for abdominal pain, constipation and diarrhea.   Endocrine: Negative for polydipsia and polyuria.   Musculoskeletal:  Positive for arthralgias. Negative for gait problem.   Neurological:  Negative for dizziness, weakness and headaches.   Psychiatric/Behavioral:  Negative for agitation and dysphoric mood.        Medical / Social / Family History     Past Medical History:   Diagnosis Date    Abnormal weight loss     Acute bronchitis     Acute superficial gastritis without hemorrhage 2021    Altered bowel function     Anxiety     Back pain     Belching symptom     Benign hypertension     Cobalamin deficiency     Colon, diverticulosis 2022    Constipation     Contact dermatitis      Depression     Diarrhea     Disorder of muscle, ligament, and fascia     Dysphagia     Elevated white blood cell count     Encounter for screening colonoscopy 03/22/2016    Essential hypertension     Essential tremor     Excessive weight loss     Fracture of thoracic spine     Frequent urination     Ganglion, right wrist     GERD (gastroesophageal reflux disease)     HH (hiatus hernia) 9/1/2021    History of bone density study 10/26/2020    History of esophagogastroduodenoscopy (EGD) 06/23/2016    History of mammography, screening 03/29/2017    Hyperlipemia     Incomplete emptying of bladder     Insomnia     Multiple joint pain     Osteoarthritis     Osteopenia     Pain, lumbar region     Pap smear for cervical cancer screening 03/2010    Scoliosis     Screening for colon cancer 6/13/2022    Stomach cramps     Subconjunctival hemorrhage     Vitamin D deficiency        Past Surgical History:   Procedure Laterality Date    ADENOIDECTOMY      BREAST BIOPSY      CERVIX LESION DESTRUCTION      CHOLECYSTECTOMY      HYSTERECTOMY      suspensiion of uterus  1965    TONSILLECTOMY      VERTEBROPLASTY  06/06/2013    Dr. alejandro       Social History  Ms.  reports that she has quit smoking. She has been exposed to tobacco smoke. She has never used smokeless tobacco. She reports current alcohol use of about 1.0 standard drink of alcohol per week. She reports that she does not use drugs.    Family History  Ms.'s family history includes Breast cancer in her maternal aunt, maternal aunt, and sister; Cancer in her sister; Dementia in her mother; Diabetes in her mother; Heart disease in her mother; Hypertension in her mother.    Medications and Allergies     Medications  Outpatient Medications Marked as Taking for the 4/8/24 encounter (Office Visit) with Char Cardenas FNP   Medication Sig Dispense Refill    amLODIPine (NORVASC) 10 MG tablet Take 1 tablet (10 mg total) by mouth every evening. 90 tablet 3    B-complex with vitamin C  (Z-BEC OR EQUIV) tablet Take 1 tablet by mouth once daily.      cholecalciferol, vitamin D3, 125 mcg (5,000 unit) capsule Take 5,000 Units by mouth once daily.      cyclobenzaprine (FLEXERIL) 10 MG tablet Take 10 mg by mouth 3 (three) times daily as needed.      ipratropium (ATROVENT) 42 mcg (0.06 %) nasal spray 2 sprays by Nasal route 4 (four) times daily. 15 mL 1    mupirocin (BACTROBAN) 2 % ointment Apply topically 2 (two) times daily.      neomycin-polymyxin-dexamethasone (MAXITROL) 3.5mg/mL-10,000 unit/mL-0.1 % DrpS Place 1 drop into the right eye 3 (three) times daily.      ofloxacin (FLOXIN) 0.3 % otic solution Place 5 drops into both ears once daily. 10 mL 0    omeprazole (PRILOSEC) 20 MG capsule Take 1 capsule (20 mg total) by mouth every evening. 90 capsule 3    ondansetron (ZOFRAN) 4 MG tablet Take 4 mg by mouth every 8 (eight) hours as needed.      pantoprazole (PROTONIX) 40 MG tablet Take 1 tablet (40 mg total) by mouth once daily. 90 tablet 3    potassium chloride (MICRO-K) 10 MEQ CpSR Take 1 capsule (10 mEq total) by mouth 2 (two) times daily. 180 capsule 3    pregabalin (LYRICA) 50 MG capsule Take 50 mg by mouth.      propranoloL (INDERAL) 40 MG tablet Take 1 tablet (40 mg total) by mouth 2 (two) times daily. 180 tablet 3    triamterene-hydrochlorothiazide 37.5-25 mg (MAXZIDE-25) 37.5-25 mg per tablet Take 1 tablet by mouth daily as needed (edema). 1/2 to 1 po daily PRN edema 90 tablet 3    vit C,G-Tp-ktbrw-lutein-zeaxan (PRESERVISION AREDS-2) 250-90-40-1 mg Cap Take by mouth.       Current Facility-Administered Medications for the 4/8/24 encounter (Office Visit) with Char Cardenas FNP   Medication Dose Route Frequency Provider Last Rate Last Admin    [COMPLETED] cefTRIAXone injection 1 g  1 g Intramuscular 1 time in Clinic/HOD Char Cardenas FNP   1 g at 04/08/24 1042    [COMPLETED] dexAMETHasone injection 4 mg  4 mg Intramuscular 1 time in Clinic/HOD Char Cardenas FNP   4 mg at 04/08/24 1042        Allergies  Review of patient's allergies indicates:   Allergen Reactions    Augmentin [amoxicillin-pot clavulanate]     Macrobid [nitrofurantoin monohyd/m-cryst]     Sulfa (sulfonamide antibiotics)        Physical Examination     Vitals:    04/08/24 0943   BP: 120/76   Pulse: 67   Resp: 18   Temp: 97.9 °F (36.6 °C)     Physical Exam  Constitutional:       General: She is not in acute distress.  HENT:      Head: Normocephalic.      Right Ear: Tympanic membrane normal.      Left Ear: Tympanic membrane normal.      Nose: Congestion present.      Mouth/Throat:      Mouth: Mucous membranes are moist.      Pharynx: Posterior oropharyngeal erythema present.   Eyes:      Extraocular Movements: Extraocular movements intact.      Comments: Redness to inner canthus of left eye    Cardiovascular:      Rate and Rhythm: Normal rate.   Pulmonary:      Effort: Pulmonary effort is normal. No respiratory distress.   Abdominal:      General: Bowel sounds are normal.      Palpations: Abdomen is soft.   Musculoskeletal:         General: Normal range of motion.      Cervical back: Normal range of motion.   Skin:     General: Skin is warm.   Neurological:      Mental Status: She is alert.   Psychiatric:         Behavior: Behavior normal.           Imaging / Labs     No visits with results within 1 Day(s) from this visit.   Latest known visit with results is:   Office Visit on 08/30/2023   Component Date Value Ref Range Status    Color, UA 08/30/2023 Light-Yellow  Colorless, Straw, Yellow, Light Yellow, Dark Yellow Final    Clarity, UA 08/30/2023 Clear  Clear Final    pH, UA 08/30/2023 6.0  5.0 to 8.0 pH Units Final    Leukocytes, UA 08/30/2023 Negative  Negative Final    Nitrites, UA 08/30/2023 Negative  Negative Final    Protein, UA 08/30/2023 Negative  Negative Final    Glucose, UA 08/30/2023 Normal  Normal mg/dL Final    Ketones, UA 08/30/2023 Negative  Negative mg/dL Final    Urobilinogen, UA 08/30/2023 Normal  0.2, 1.0, Normal  mg/dL Final    Bilirubin, UA 08/30/2023 Negative  Negative Final    Blood, UA 08/30/2023 Negative  Negative Final    Specific Gravity, UA 08/30/2023 1.013  <=1.030 Final    Sodium 08/30/2023 133 (L)  136 - 145 mmol/L Final    Potassium 08/30/2023 3.2 (L)  3.5 - 5.1 mmol/L Final    Chloride 08/30/2023 98  98 - 107 mmol/L Final    CO2 08/30/2023 27  21 - 32 mmol/L Final    Anion Gap 08/30/2023 11  7 - 16 mmol/L Final    Glucose 08/30/2023 86  74 - 106 mg/dL Final    BUN 08/30/2023 17  7 - 18 mg/dL Final    Creatinine 08/30/2023 0.88  0.55 - 1.02 mg/dL Final    BUN/Creatinine Ratio 08/30/2023 19  6 - 20 Final    Calcium 08/30/2023 8.9  8.5 - 10.1 mg/dL Final    Total Protein 08/30/2023 6.5  6.4 - 8.2 g/dL Final    Albumin 08/30/2023 3.5  3.5 - 5.0 g/dL Final    Globulin 08/30/2023 3.0  2.0 - 4.0 g/dL Final    A/G Ratio 08/30/2023 1.2   Final    Bilirubin, Total 08/30/2023 0.4  >0.0 - 1.2 mg/dL Final    Alk Phos 08/30/2023 125  55 - 142 U/L Final    ALT 08/30/2023 29  13 - 56 U/L Final    AST 08/30/2023 20  15 - 37 U/L Final    eGFR 08/30/2023 68  >=60 mL/min/1.73m2 Final    Triglycerides 08/30/2023 509 (H)  35 - 150 mg/dL Final    Cholesterol 08/30/2023 247 (H)  0 - 200 mg/dL Final    HDL Cholesterol 08/30/2023 43  40 - 60 mg/dL Final    Cholesterol/HDL Ratio (Risk Factor) 08/30/2023 5.7   Final    Non-HDL 08/30/2023 204  mg/dL Final    VLDL 08/30/2023    Final    TSH 08/30/2023 1.950  0.358 - 3.740 uIU/mL Final    Vitamin D 25-Hydroxy, Blood 08/30/2023 36.6  ng/mL Final    WBC 08/30/2023 7.65  4.50 - 11.00 K/uL Final    RBC 08/30/2023 3.95 (L)  4.20 - 5.40 M/uL Final    Hemoglobin 08/30/2023 12.4  12.0 - 16.0 g/dL Final    Hematocrit 08/30/2023 35.4 (L)  38.0 - 47.0 % Final    MCV 08/30/2023 89.6  80.0 - 96.0 fL Final    MCH 08/30/2023 31.4 (H)  27.0 - 31.0 pg Final    MCHC 08/30/2023 35.0  32.0 - 36.0 g/dL Final    RDW 08/30/2023 12.6  11.5 - 14.5 % Final    Platelet Count 08/30/2023 496 (H)  150 - 400 K/uL Final     MPV 08/30/2023 8.6 (L)  9.4 - 12.4 fL Final    Neutrophils % 08/30/2023 59.6  53.0 - 65.0 % Final    Lymphocytes % 08/30/2023 26.9 (L)  27.0 - 41.0 % Final    Monocytes % 08/30/2023 10.5 (H)  2.0 - 6.0 % Final    Eosinophils % 08/30/2023 1.7  1.0 - 4.0 % Final    Basophils % 08/30/2023 0.8  0.0 - 1.0 % Final    Immature Granulocytes % 08/30/2023 0.5 (H)  0.0 - 0.4 % Final    nRBC, Auto 08/30/2023 0.0  <=0.0 % Final    Neutrophils, Abs 08/30/2023 4.56  1.80 - 7.70 K/uL Final    Lymphocytes, Absolute 08/30/2023 2.06  1.00 - 4.80 K/uL Final    Monocytes, Absolute 08/30/2023 0.80  0.00 - 0.80 K/uL Final    Eosinophils, Absolute 08/30/2023 0.13  0.00 - 0.50 K/uL Final    Basophils, Absolute 08/30/2023 0.06  0.00 - 0.20 K/uL Final    Immature Granulocytes, Absolute 08/30/2023 0.04  0.00 - 0.04 K/uL Final    nRBC, Absolute 08/30/2023 0.00  <=0.00 x10e3/uL Final    Diff Type 08/30/2023 Auto   Final     Colonoscopy  Narrative: Procedure Date  6/13/22    Impression  Overall   Impression:  No polyps were seen. Diverticula were present in the   sigmoid and descending colon.    Recommendation  Repeat colonoscopy in 5 years; high fiber diet.    Indication  Family history of colonic polyps in father.    Providers  Attending: Oziel Espino MD  Fellow:    Other Staff: Tierney Vang RN, Royce Andrade CRNA    Medications  Moderate sedation administered by anesthesia staff - See anesthesia   record.    Preprocedure  A history and physical has been performed, and patient medication   allergies have been reviewed. The patient's tolerance of previous   anesthesia has been reviewed. The risks and benefits of the procedure and   the sedation options and risks were discussed with the patient. All   questions were answered and informed consent obtained.    ASA Score: ASA 2 - Patient with mild systemic disease with no functional   limitations  Mallampati Airway Score: II (hard and soft palate, upper portion of   tonsils anduvula  visible)    Details of the Procedure  The patient underwent monitored anesthesia care, which was administered by   an anesthesia professional. The patient's heart rate, blood pressure,   level of consciousness, respirations, oxygen, ECG and ETCO2 were monitored   throughout the procedure. A digital rectal exam was performed. The scope   was introduced through the anus and advanced to the cecum. Retroflexion   was performed in the rectum. The quality of bowel preparation was   evaluated using the [place] Bowel Preparation Scale with scores of: right   colon = 2, transverse colon = 2, left colon = 2. The total BBPS score was   6. Bowel prep was adequate. The patient's estimated blood loss was minimal   (<5 mL). The procedure was not difficult. The patient tolerated the   procedure well. There were no apparent complications.     Scope: Pediatric Colonoscope, Pediatric Colonoscope  Scope Serial: [id number]    Events  Procedure Events   Event Event Time     Procedure Events   Event Event Time   ENDO SCOPE IN TIME 6/13/2022 10:29 AM   ENDO SCOPE OUT TIME 6/13/2022 10:30 AM   ENDO SCOPE IN TIME 6/13/2022 10:36 AM   ENDO CECUM REACHED 6/13/2022 10:46 AM   ENDO SCOPE OUT TIME 6/13/2022 10:51 AM     CECAL WITHDRAWAL TIME: 5m 13s    Findings  Diverticula with no bleeding in the descending colon and sigmoid colon      Assessment and Plan (including Health Maintenance)      Problem List  Smart Sets  Document Outside HM   :    Health Maintenance Due   Topic Date Due    COVID-19 Vaccine (1) Never done    Shingles Vaccine (1 of 2) Never done    RSV Vaccine (Age 60+ and Pregnant patients) (1 - 1-dose 60+ series) Never done    Influenza Vaccine (1) 09/01/2023    DEXA Scan  05/10/2024       Problem List Items Addressed This Visit          Ophtho    Subconjunctival hemorrhage of left eye    Current Assessment & Plan     Redness to inner canthus of left eye for several days  Noticed redness after she started coughing  Denies any  pain, pressure, diplopia or decreased VA  Advised her if area worsens or change in vision to follow up with optometry             ENT    Pansinusitis - Primary    Current Assessment & Plan     Head congestion and sinus pressure  Declines covid/flu testing  Discussed risks/benefits/alternatives for treatment  Rocephin, decadron IM  Zithromax po   Rest. Increase fluids as tolerated          Relevant Medications    cefTRIAXone injection 1 g (Completed)    dexAMETHasone injection 4 mg (Completed)    azithromycin (Z-MONIKA) 250 MG tablet    guaiFENesin (MUCINEX) 600 mg 12 hr tablet       Orthopedic    Osteopenia after menopause    Relevant Orders    DXA Bone Density Axial Skeleton 1 or more sites     Other Visit Diagnoses       Head congestion        Relevant Medications    dexAMETHasone injection 4 mg (Completed)    guaiFENesin (MUCINEX) 600 mg 12 hr tablet            Health Maintenance Topics with due status: Not Due       Topic Last Completion Date    TETANUS VACCINE 01/30/2015    Lipid Panel 08/30/2023       Future Appointments   Date Time Provider Department Center   4/18/2024  9:40 AM [facility]   8/29/2024 10:00 AM [facility]          Signature:  RAYMUNDO Regan  [facility] [address] [place] [address]  [phone]    Date of encounter: 4/8/24

## 2024-04-08 NOTE — PROGRESS NOTES
Health Maintenance Due   Topic Date Due    COVID-19 Vaccine (1) Never done    Shingles Vaccine (1 of 2) Never done    RSV Vaccine (Age 60+ and Pregnant patients) (1 - 1-dose 60+ series) Never done    Influenza Vaccine (1) 09/01/2023    DEXA Scan  05/10/2024     Discussed care gaps with pt  Pt is not interested in any vaccines   Pt requested dexa scan referral

## 2024-04-18 ENCOUNTER — HOSPITAL ENCOUNTER (OUTPATIENT)
Dept: RADIOLOGY | Facility: HOSPITAL | Age: 79
Discharge: HOME | End: 2024-04-18
Attending: NURSE PRACTITIONER
Payer: MEDICARE

## 2024-04-18 DIAGNOSIS — Z78.0 OSTEOPENIA AFTER MENOPAUSE: ICD-10-CM

## 2024-04-18 DIAGNOSIS — M85.80 OSTEOPENIA AFTER MENOPAUSE: ICD-10-CM

## 2024-04-18 PROCEDURE — 77080 DXA BONE DENSITY AXIAL: CPT | Mod: 26,XU,, | Performed by: RADIOLOGY

## 2024-04-18 PROCEDURE — 77081 DXA BONE DENSITY APPENDICULR: CPT | Mod: 26,,, | Performed by: RADIOLOGY

## 2024-04-18 PROCEDURE — 77080 DXA BONE DENSITY AXIAL: CPT | Mod: TC

## 2024-04-22 ENCOUNTER — TELEPHONE (OUTPATIENT)
Dept: FAMILY MEDICINE | Facility: CLINIC | Age: 79
End: 2024-04-22
Payer: MEDICARE

## 2024-04-22 DIAGNOSIS — M81.0 AGE-RELATED OSTEOPOROSIS WITHOUT CURRENT PATHOLOGICAL FRACTURE: Primary | Chronic | ICD-10-CM

## 2024-04-22 NOTE — PROGRESS NOTES
Discussed Dexa scan results and recommendations with pt via phone  No noted concerns  Voiced understanding

## 2024-04-22 NOTE — TELEPHONE ENCOUNTER
----- Message from RAYMUNDO Regan sent at 4/22/2024  8:05 AM CDT -----  Bone density indicates osteoporosis. I have added a treatment plan for prolia. Ethan alvarez  will call and set up a time for her

## 2024-04-25 ENCOUNTER — INFUSION (OUTPATIENT)
Dept: INFUSION THERAPY | Facility: HOSPITAL | Age: 79
End: 2024-04-25
Attending: NURSE PRACTITIONER
Payer: MEDICARE

## 2024-04-25 VITALS — HEIGHT: 62 IN | WEIGHT: 128 LBS | BODY MASS INDEX: 23.55 KG/M2

## 2024-04-25 DIAGNOSIS — M81.0 OSTEOPOROSIS, UNSPECIFIED OSTEOPOROSIS TYPE, UNSPECIFIED PATHOLOGICAL FRACTURE PRESENCE: Primary | ICD-10-CM

## 2024-04-25 DIAGNOSIS — M81.0 AGE-RELATED OSTEOPOROSIS WITHOUT CURRENT PATHOLOGICAL FRACTURE: Primary | ICD-10-CM

## 2024-04-25 LAB — CALCIUM SERPL-MCNC: 9.2 MG/DL (ref 8.5–10.1)

## 2024-04-25 PROCEDURE — 63600175 PHARM REV CODE 636 W HCPCS: Mod: JZ,TB | Performed by: NURSE PRACTITIONER

## 2024-04-25 PROCEDURE — 96372 THER/PROPH/DIAG INJ SC/IM: CPT

## 2024-04-25 RX ADMIN — DENOSUMAB 60 MG: 60 INJECTION SUBCUTANEOUS at 01:04

## 2024-04-25 NOTE — PROGRESS NOTES
1240 Pt in room 1.      1300 Calcium level checked today was 9.2.  Pt stated that she is taking her Calcium with Vitamin D.  Administered Prolia 60mg SQ to left upper arm per protocol.  Pt tolerated well.    1320 No adverse reaction noted.  D/C home, ambulatory with appointment in hand to return in 6 months for next Prolia injection.

## 2024-04-30 ENCOUNTER — EXTERNAL CHRONIC CARE MANAGEMENT (OUTPATIENT)
Dept: FAMILY MEDICINE | Facility: CLINIC | Age: 79
End: 2024-04-30
Payer: MEDICARE

## 2024-04-30 PROCEDURE — G0511 CCM/BHI BY RHC/FQHC 20MIN MO: HCPCS | Mod: ,,, | Performed by: FAMILY MEDICINE

## 2024-05-31 ENCOUNTER — EXTERNAL CHRONIC CARE MANAGEMENT (OUTPATIENT)
Dept: FAMILY MEDICINE | Facility: CLINIC | Age: 79
End: 2024-05-31
Payer: MEDICARE

## 2024-05-31 PROCEDURE — G0511 CCM/BHI BY RHC/FQHC 20MIN MO: HCPCS | Mod: ,,, | Performed by: FAMILY MEDICINE

## 2024-06-21 ENCOUNTER — OFFICE VISIT (OUTPATIENT)
Dept: FAMILY MEDICINE | Facility: CLINIC | Age: 79
End: 2024-06-21
Payer: MEDICARE

## 2024-06-21 VITALS
WEIGHT: 128 LBS | DIASTOLIC BLOOD PRESSURE: 74 MMHG | BODY MASS INDEX: 23.55 KG/M2 | TEMPERATURE: 97 F | SYSTOLIC BLOOD PRESSURE: 118 MMHG | RESPIRATION RATE: 18 BRPM | HEIGHT: 62 IN | OXYGEN SATURATION: 98 % | HEART RATE: 62 BPM

## 2024-06-21 DIAGNOSIS — J01.00 ACUTE NON-RECURRENT MAXILLARY SINUSITIS: ICD-10-CM

## 2024-06-21 DIAGNOSIS — R05.1 ACUTE COUGH: Primary | ICD-10-CM

## 2024-06-21 LAB
CTP QC/QA: YES
CTP QC/QA: YES
POC MOLECULAR INFLUENZA A AGN: NEGATIVE
POC MOLECULAR INFLUENZA B AGN: NEGATIVE
SARS-COV-2 RDRP RESP QL NAA+PROBE: NEGATIVE

## 2024-06-21 RX ORDER — PROPRANOLOL HYDROCHLORIDE 40 MG/1
40 TABLET ORAL 2 TIMES DAILY
COMMUNITY

## 2024-06-21 RX ORDER — IVERMECTIN 3 MG/1
3 TABLET ORAL ONCE
COMMUNITY

## 2024-06-21 RX ORDER — PROMETHAZINE HYDROCHLORIDE AND DEXTROMETHORPHAN HYDROBROMIDE 6.25; 15 MG/5ML; MG/5ML
5 SYRUP ORAL NIGHTLY PRN
Qty: 118 ML | Refills: 0 | Status: SHIPPED | OUTPATIENT
Start: 2024-06-21 | End: 2024-07-01

## 2024-06-21 RX ORDER — AMOXICILLIN 500 MG/1
500 TABLET, FILM COATED ORAL EVERY 12 HOURS
Qty: 20 TABLET | Refills: 0 | Status: SHIPPED | OUTPATIENT
Start: 2024-06-21 | End: 2024-07-01

## 2024-06-21 NOTE — ASSESSMENT & PLAN NOTE
Started patient on amoxicillin and phenergan dm- discussed use and side effects  Patient states that she has taken amoxicillin in the past and tolerated well  Increase fluids, wash hands often and pop ears as able

## 2024-06-21 NOTE — PROGRESS NOTES
Health Maintenance Due   Topic Date Due    Shingles Vaccine (1 of 2) Never done    RSV Vaccine (Age 60+ and Pregnant patients) (1 - 1-dose 60+ series) Never done    COVID-19 Vaccine (1 - 2023-24 season) Never done     Discussed care gaps.  Not interested in vaccs.

## 2024-06-21 NOTE — PROGRESS NOTES
RAYMUNDO Bailey   [place] [address] [facility] [address]  [place]  [address]      PATIENT NAME: Masoud Major  : 1945  DATE: 24  MRN: [id number]      Billing Provider: RAYMUNDO Bailey  Level of Service:   Patient PCP Information       Provider PCP Type    Charley Holm MD General            Reason for Visit / Chief Complaint: Otalgia (Tony ears itch w/ occass pain.), Sore Throat, Cough (nonproductive), and Eye Pain (R eye pain/redness./Symptoms X6 days.)       Update PCP  Update Chief Complaint         History of Present Illness / Problem Focused Workflow     Masoud Major presents to the clinic with Otalgia (Tony ears itch w/ occass pain.), Sore Throat, Cough (nonproductive), and Eye Pain (R eye pain/redness./Symptoms X6 days.)     Patient presents to clinic with c/o nonproductive cough, sore throat, congestion, ear pressure / itching x 6 days. Patient reports occasional chills but denies fever. Patient has taken otc medication with minimal improvement.         Review of Systems     @Review of Systems   HENT:  Positive for nasal congestion, postnasal drip, rhinorrhea and sinus pressure/congestion. Negative for ear pain.    Respiratory:  Negative for cough.    Cardiovascular:  Negative for chest pain.   Neurological:  Positive for headaches.       Medical / Social / Family History     Past Medical History:   Diagnosis Date    Abnormal weight loss     Acute bronchitis     Acute superficial gastritis without hemorrhage 2021    Altered bowel function     Anxiety     Back pain     Belching symptom     Benign hypertension     Cobalamin deficiency     Colon, diverticulosis 2022    Constipation     Contact dermatitis     Depression     Diarrhea     Disorder of muscle, ligament, and fascia     Dysphagia     Elevated white blood cell count     Encounter for screening colonoscopy 2016    Essential hypertension     Essential tremor     Excessive weight loss     Fracture of thoracic  spine     Frequent urination     Ganglion, right wrist     GERD (gastroesophageal reflux disease)     HH (hiatus hernia) 9/1/2021    History of bone density study 10/26/2020    History of esophagogastroduodenoscopy (EGD) 06/23/2016    History of mammography, screening 03/29/2017    Hyperlipemia     Incomplete emptying of bladder     Insomnia     Multiple joint pain     Osteoarthritis     Osteopenia     Pain, lumbar region     Pap smear for cervical cancer screening 03/2010    Scoliosis     Screening for colon cancer 6/13/2022    Stomach cramps     Subconjunctival hemorrhage     Vitamin D deficiency        Past Surgical History:   Procedure Laterality Date    ADENOIDECTOMY      BREAST BIOPSY      CERVIX LESION DESTRUCTION      CHOLECYSTECTOMY      HYSTERECTOMY      suspensiion of uterus  1965    TONSILLECTOMY      VERTEBROPLASTY  06/06/2013    Dr. alejandro       Social History  Ms.  reports that she has never smoked. She has been exposed to tobacco smoke. She has never used smokeless tobacco. She reports current alcohol use of about 1.0 standard drink of alcohol per week. She reports that she does not use drugs.    Family History  Ms.'s family history includes Breast cancer in her maternal aunt, maternal aunt, and sister; Cancer in her sister; Dementia in her mother; Diabetes in her mother; Heart disease in her mother; Hypertension in her mother.    Medications and Allergies     Medications  Outpatient Medications Marked as Taking for the 6/21/24 encounter (Office Visit) with Helen Domínguez FNP   Medication Sig Dispense Refill    amLODIPine (NORVASC) 10 MG tablet Take 1 tablet (10 mg total) by mouth every evening. 90 tablet 3    B-complex with vitamin C (Z-BEC OR EQUIV) tablet Take 1 tablet by mouth once daily.      cholecalciferol, vitamin D3, 125 mcg (5,000 unit) capsule Take 5,000 Units by mouth once daily.      ivermectin (STROMECTOL) 3 mg Tab Take 3 mg by mouth once.      omeprazole (PRILOSEC) 20 MG capsule Take 1  capsule (20 mg total) by mouth every evening. 90 capsule 3    pantoprazole (PROTONIX) 40 MG tablet Take 1 tablet (40 mg total) by mouth once daily. 90 tablet 3    potassium chloride (MICRO-K) 10 MEQ CpSR Take 1 capsule (10 mEq total) by mouth 2 (two) times daily. 180 capsule 3    propranoloL (INDERAL) 40 MG tablet Take 40 mg by mouth 2 (two) times daily.      vit C,O-Ck-allfn-lutein-zeaxan (PRESERVISION AREDS-2) 250-90-40-1 mg Cap Take by mouth.         Allergies  Review of patient's allergies indicates:   Allergen Reactions    Augmentin [amoxicillin-pot clavulanate]     Macrobid [nitrofurantoin monohyd/m-cryst]     Sulfa (sulfonamide antibiotics)        Physical Examination     Vitals:    06/21/24 0909   BP: 118/74   Pulse: 62   Resp: 18   Temp: 97.3 °F (36.3 °C)     Physical Exam  Constitutional:       General: She is not in acute distress.     Appearance: Normal appearance.   HENT:      Right Ear: Tympanic membrane is bulging.      Left Ear: Tympanic membrane is bulging.      Mouth/Throat:      Pharynx: Pharyngeal swelling and posterior oropharyngeal erythema present.   Cardiovascular:      Rate and Rhythm: Normal rate and regular rhythm.   Pulmonary:      Effort: Pulmonary effort is normal. No respiratory distress.      Breath sounds: Normal breath sounds. No wheezing, rhonchi or rales.   Skin:     General: Skin is warm and dry.   Neurological:      Mental Status: She is alert.               Lab Results   Component Value Date    WBC 7.65 08/30/2023    HGB 12.4 08/30/2023    HCT 35.4 (L) 08/30/2023    MCV 89.6 08/30/2023     (H) 08/30/2023          Sodium   Date Value Ref Range Status   08/30/2023 133 (L) 136 - 145 mmol/L Final     Potassium   Date Value Ref Range Status   08/30/2023 3.2 (L) 3.5 - 5.1 mmol/L Final     Chloride   Date Value Ref Range Status   08/30/2023 98 98 - 107 mmol/L Final     CO2   Date Value Ref Range Status   08/30/2023 27 21 - 32 mmol/L Final     Glucose   Date Value Ref Range  Status   08/30/2023 86 74 - 106 mg/dL Final     BUN   Date Value Ref Range Status   08/30/2023 17 7 - 18 mg/dL Final     Creatinine   Date Value Ref Range Status   08/30/2023 0.88 0.55 - 1.02 mg/dL Final     Calcium   Date Value Ref Range Status   04/25/2024 9.2 8.5 - 10.1 mg/dL Final     Total Protein   Date Value Ref Range Status   08/30/2023 6.5 6.4 - 8.2 g/dL Final     Albumin   Date Value Ref Range Status   08/30/2023 3.5 3.5 - 5.0 g/dL Final     Bilirubin, Total   Date Value Ref Range Status   08/30/2023 0.4 >0.0 - 1.2 mg/dL Final     Alk Phos   Date Value Ref Range Status   08/30/2023 125 55 - 142 U/L Final     AST   Date Value Ref Range Status   08/30/2023 20 15 - 37 U/L Final     ALT   Date Value Ref Range Status   08/30/2023 29 13 - 56 U/L Final     Anion Gap   Date Value Ref Range Status   08/30/2023 11 7 - 16 mmol/L Final     eGFR   Date Value Ref Range Status   07/20/2021 62 >=60 mL/min/1.73m² Final      DXA Bone Density Axial Skeleton 1 or more sites  Narrative: EXAMINATION:  DXA BONE DENSITY AXIAL SKELETON 1 OR MORE SITES    CLINICAL HISTORY:  Other specified disorders of bone density and structure, unspecified site    TECHNIQUE:  Bone densitometry performed    COMPARISON:  None available    FINDINGS:  Vertebral body T score measurements are estimated at -2.5    Distal radius and ulna t score measurements were estimated at -3.9  Impression: Findings fall within the osteoporosis range  high fracture risk.    Electronically signed by: Mando Royal  Date:    04/18/2024  Time:    09:15     Procedures   Assessment and Plan (including Health Maintenance)      Problem List  Smart Sets  Document Outside HM   :    Plan:           Problem List Items Addressed This Visit          ENT    Acute non-recurrent maxillary sinusitis    Current Assessment & Plan     Started patient on amoxicillin and phenergan dm- discussed use and side effects  Patient states that she has taken amoxicillin in the past and tolerated  well  Increase fluids, wash hands often and pop ears as able          Other Visit Diagnoses       Acute cough    -  Primary    Relevant Orders    POCT Influenza A/B Molecular (Completed)    POCT COVID-19 Rapid Screening (Completed)            Health Maintenance Topics with due status: Not Due       Topic Last Completion Date    TETANUS VACCINE 01/30/2015    Influenza Vaccine 10/26/2020    Lipid Panel 08/30/2023    DEXA Scan 04/18/2024       Future Appointments   Date Time Provider Department Center   8/29/2024 10:00 AM Charley Holm MD [facility] [place] [facility]   10/30/2024 10:00 AM INF[facility], [facility] [place] [facility]         Health Maintenance Due   Topic Date Due    Shingles Vaccine (1 of 2) Never done    RSV Vaccine (Age 60+ and Pregnant patients) (1 - 1-dose 60+ series) Never done    COVID-19 Vaccine (1 - 2023-24 season) Never done        Follow up if symptoms worsen or fail to improve.     Signature:  RAYMUNDO Bailey  [place] [address]  [address]    Date of encounter: 6/21/24

## 2024-06-30 ENCOUNTER — EXTERNAL CHRONIC CARE MANAGEMENT (OUTPATIENT)
Dept: FAMILY MEDICINE | Facility: CLINIC | Age: 79
End: 2024-06-30
Payer: MEDICARE

## 2024-06-30 PROCEDURE — G0511 CCM/BHI BY RHC/FQHC 20MIN MO: HCPCS | Mod: ,,, | Performed by: FAMILY MEDICINE

## 2024-07-31 ENCOUNTER — EXTERNAL CHRONIC CARE MANAGEMENT (OUTPATIENT)
Dept: FAMILY MEDICINE | Facility: CLINIC | Age: 79
End: 2024-07-31
Payer: MEDICARE

## 2024-07-31 PROCEDURE — G0511 CCM/BHI BY RHC/FQHC 20MIN MO: HCPCS | Mod: ,,, | Performed by: FAMILY MEDICINE

## 2024-08-31 ENCOUNTER — EXTERNAL CHRONIC CARE MANAGEMENT (OUTPATIENT)
Dept: FAMILY MEDICINE | Facility: CLINIC | Age: 79
End: 2024-08-31
Payer: MEDICARE

## 2024-08-31 PROCEDURE — G0511 CCM/BHI BY RHC/FQHC 20MIN MO: HCPCS | Mod: ,,, | Performed by: FAMILY MEDICINE

## 2024-09-23 PROBLEM — J01.00 ACUTE NON-RECURRENT MAXILLARY SINUSITIS: Status: RESOLVED | Noted: 2024-06-21 | Resolved: 2024-09-23

## 2024-09-27 RX ORDER — PANTOPRAZOLE SODIUM 40 MG/1
40 TABLET, DELAYED RELEASE ORAL DAILY
Qty: 90 TABLET | Refills: 3 | Status: SHIPPED | OUTPATIENT
Start: 2024-09-27

## 2024-09-30 ENCOUNTER — EXTERNAL CHRONIC CARE MANAGEMENT (OUTPATIENT)
Dept: FAMILY MEDICINE | Facility: CLINIC | Age: 79
End: 2024-09-30
Payer: MEDICARE

## 2024-09-30 PROCEDURE — G0511 CCM/BHI BY RHC/FQHC 20MIN MO: HCPCS | Mod: ,,, | Performed by: FAMILY MEDICINE

## 2024-10-28 ENCOUNTER — INFUSION (OUTPATIENT)
Dept: INFUSION THERAPY | Facility: HOSPITAL | Age: 79
End: 2024-10-28
Attending: NURSE PRACTITIONER
Payer: MEDICARE

## 2024-10-28 VITALS
OXYGEN SATURATION: 98 % | HEART RATE: 65 BPM | DIASTOLIC BLOOD PRESSURE: 61 MMHG | WEIGHT: 128.06 LBS | BODY MASS INDEX: 23.57 KG/M2 | SYSTOLIC BLOOD PRESSURE: 105 MMHG | HEIGHT: 62 IN | TEMPERATURE: 98 F | RESPIRATION RATE: 18 BRPM

## 2024-10-28 DIAGNOSIS — M81.0 OSTEOPOROSIS, UNSPECIFIED OSTEOPOROSIS TYPE, UNSPECIFIED PATHOLOGICAL FRACTURE PRESENCE: Primary | ICD-10-CM

## 2024-10-28 DIAGNOSIS — M81.0 AGE-RELATED OSTEOPOROSIS WITHOUT CURRENT PATHOLOGICAL FRACTURE: Primary | ICD-10-CM

## 2024-10-28 LAB — CALCIUM SERPL-MCNC: 9.1 MG/DL (ref 8.5–10.1)

## 2024-10-28 PROCEDURE — 63600175 PHARM REV CODE 636 W HCPCS: Mod: JZ,TB | Performed by: NURSE PRACTITIONER

## 2024-10-28 PROCEDURE — 96372 THER/PROPH/DIAG INJ SC/IM: CPT

## 2024-10-28 RX ADMIN — DENOSUMAB 60 MG: 60 INJECTION SUBCUTANEOUS at 10:10

## 2024-10-31 ENCOUNTER — EXTERNAL CHRONIC CARE MANAGEMENT (OUTPATIENT)
Dept: FAMILY MEDICINE | Facility: CLINIC | Age: 79
End: 2024-10-31
Payer: MEDICARE

## 2024-10-31 PROCEDURE — G0511 CCM/BHI BY RHC/FQHC 20MIN MO: HCPCS | Mod: ,,, | Performed by: FAMILY MEDICINE

## 2024-11-30 ENCOUNTER — EXTERNAL CHRONIC CARE MANAGEMENT (OUTPATIENT)
Dept: FAMILY MEDICINE | Facility: CLINIC | Age: 79
End: 2024-11-30
Payer: MEDICARE

## 2024-11-30 PROCEDURE — G0511 CCM/BHI BY RHC/FQHC 20MIN MO: HCPCS | Mod: ,,, | Performed by: FAMILY MEDICINE

## 2024-12-31 ENCOUNTER — EXTERNAL CHRONIC CARE MANAGEMENT (OUTPATIENT)
Dept: FAMILY MEDICINE | Facility: CLINIC | Age: 79
End: 2024-12-31
Payer: MEDICARE

## 2024-12-31 PROCEDURE — G0511 CCM/BHI BY RHC/FQHC 20MIN MO: HCPCS | Mod: ,,, | Performed by: FAMILY MEDICINE

## 2025-01-24 RX ORDER — PROPRANOLOL HYDROCHLORIDE 40 MG/1
40 TABLET ORAL 2 TIMES DAILY
Qty: 180 TABLET | Refills: 3 | Status: SHIPPED | OUTPATIENT
Start: 2025-01-24

## 2025-01-24 NOTE — TELEPHONE ENCOUNTER
----- Message from Georgiana sent at 1/24/2025 10:35 AM CST -----  Regarding: Med Refill  Needs refill for Propanolog called into [facility] in [place]   I have personally evaluated and examined the patient. The Attending was available to me as a supervising provider if needed.

## 2025-01-31 ENCOUNTER — EXTERNAL CHRONIC CARE MANAGEMENT (OUTPATIENT)
Dept: FAMILY MEDICINE | Facility: CLINIC | Age: 80
End: 2025-01-31
Payer: MEDICARE

## 2025-01-31 PROCEDURE — G0511 CCM/BHI BY RHC/FQHC 20MIN MO: HCPCS | Mod: ,,, | Performed by: FAMILY MEDICINE

## 2025-02-28 ENCOUNTER — EXTERNAL CHRONIC CARE MANAGEMENT (OUTPATIENT)
Dept: FAMILY MEDICINE | Facility: CLINIC | Age: 80
End: 2025-02-28
Payer: MEDICARE

## 2025-02-28 PROCEDURE — G0511 CCM/BHI BY RHC/FQHC 20MIN MO: HCPCS | Mod: ,,, | Performed by: FAMILY MEDICINE

## 2025-03-03 DIAGNOSIS — K21.9 GASTROESOPHAGEAL REFLUX DISEASE, UNSPECIFIED WHETHER ESOPHAGITIS PRESENT: Chronic | ICD-10-CM

## 2025-03-03 RX ORDER — OMEPRAZOLE 20 MG/1
CAPSULE, DELAYED RELEASE ORAL
Qty: 90 CAPSULE | Refills: 3 | Status: SHIPPED | OUTPATIENT
Start: 2025-03-03

## 2025-04-24 ENCOUNTER — OFFICE VISIT (OUTPATIENT)
Dept: FAMILY MEDICINE | Facility: CLINIC | Age: 80
End: 2025-04-24
Payer: MEDICARE

## 2025-04-24 VITALS
TEMPERATURE: 98 F | OXYGEN SATURATION: 95 % | DIASTOLIC BLOOD PRESSURE: 69 MMHG | WEIGHT: 128.81 LBS | BODY MASS INDEX: 23.7 KG/M2 | HEIGHT: 62 IN | HEART RATE: 90 BPM | RESPIRATION RATE: 18 BRPM | SYSTOLIC BLOOD PRESSURE: 134 MMHG

## 2025-04-24 DIAGNOSIS — R05.9 COUGH, UNSPECIFIED TYPE: ICD-10-CM

## 2025-04-24 DIAGNOSIS — R91.1 SOLITARY PULMONARY NODULE: ICD-10-CM

## 2025-04-24 DIAGNOSIS — J18.9 COMMUNITY ACQUIRED PNEUMONIA OF LEFT LOWER LOBE OF LUNG: Primary | ICD-10-CM

## 2025-04-24 DIAGNOSIS — J02.9 SORE THROAT: ICD-10-CM

## 2025-04-24 LAB
CTP QC/QA: YES
MOLECULAR STREP A: NEGATIVE
POC MOLECULAR INFLUENZA A AGN: NEGATIVE
POC MOLECULAR INFLUENZA B AGN: NEGATIVE
SARS-COV-2 RDRP RESP QL NAA+PROBE: NEGATIVE

## 2025-04-24 PROCEDURE — 87502 INFLUENZA DNA AMP PROBE: CPT | Mod: RHCUB | Performed by: STUDENT IN AN ORGANIZED HEALTH CARE EDUCATION/TRAINING PROGRAM

## 2025-04-24 PROCEDURE — 87635 SARS-COV-2 COVID-19 AMP PRB: CPT | Mod: RHCUB | Performed by: STUDENT IN AN ORGANIZED HEALTH CARE EDUCATION/TRAINING PROGRAM

## 2025-04-24 PROCEDURE — 87651 STREP A DNA AMP PROBE: CPT | Mod: RHCUB | Performed by: STUDENT IN AN ORGANIZED HEALTH CARE EDUCATION/TRAINING PROGRAM

## 2025-04-24 PROCEDURE — 99214 OFFICE O/P EST MOD 30 MIN: CPT | Mod: ,,, | Performed by: STUDENT IN AN ORGANIZED HEALTH CARE EDUCATION/TRAINING PROGRAM

## 2025-04-24 RX ORDER — CETIRIZINE HYDROCHLORIDE 10 MG/1
10 TABLET ORAL DAILY
Qty: 30 TABLET | Refills: 0 | Status: SHIPPED | OUTPATIENT
Start: 2025-04-24 | End: 2026-04-24

## 2025-04-24 RX ORDER — DOXYCYCLINE 100 MG/1
100 CAPSULE ORAL EVERY 12 HOURS
Qty: 20 CAPSULE | Refills: 0 | Status: SHIPPED | OUTPATIENT
Start: 2025-04-24 | End: 2025-05-04

## 2025-04-24 RX ORDER — FLUTICASONE PROPIONATE 50 MCG
1 SPRAY, SUSPENSION (ML) NASAL DAILY
Qty: 18.2 ML | Refills: 0 | Status: SHIPPED | OUTPATIENT
Start: 2025-04-24

## 2025-04-24 RX ORDER — BENZONATATE 100 MG/1
100 CAPSULE ORAL 3 TIMES DAILY PRN
Qty: 30 CAPSULE | Refills: 0 | Status: SHIPPED | OUTPATIENT
Start: 2025-04-24 | End: 2025-05-04

## 2025-04-24 NOTE — PROGRESS NOTES
Progress Note     JOSÉ STEVE MD   [address]  [place] [address]     PATIENT NAME: Masoud Major  : 1945  DATE: 25  MRN: [id number]      Billing Provider: JOSÉ STEVE MD  Level of Service: NC OFFICE/OUTPT VISIT, EST, LEVL IV, 30-39 MIN  Patient PCP Information       Provider PCP Type    Charley Holm MD General                Cough (Pt present to clinic chronic cough and chest congestion, and sore throat. Symptoms been present since Monday.  ) and Health Maintenance (Pt defers all vaccines. )      SUBJECTIVE:     Masoud Major is a 80 y.o.female who presents to clinic for Cough (Pt present to clinic chronic cough and chest congestion, and sore throat. Symptoms been present since Monday.  ) and Health Maintenance (Pt defers all vaccines. )    Patient presents to clinic today with URI symptoms.  Patient notes onset of symptoms earlier in the week.  Patient notes cough, congestion, rhinorrhea, and chest congestion.  Patient notes that symptoms or continuing to progress prompting her to present to clinic today for evaluation.  Patient denies any shortness of breath.  She has been afebrile.  She denies any purulent drainage or purulent sputum production at this time. Patient denies any known sick contacts.  Patient does feel that the infection is moving into her chest.    All other pertinent review of systems negative. Please see HPI for details.     Past Medical History:  has a past medical history of Abnormal weight loss, Acute bronchitis, Acute superficial gastritis without hemorrhage (2021), Altered bowel function, Anxiety, Back pain, Belching symptom, Benign hypertension, Cobalamin deficiency, Colon, diverticulosis (2022), Constipation, Contact dermatitis, Depression, Diarrhea, Disorder of muscle, ligament, and fascia, Dysphagia, Elevated white blood cell count, Encounter for screening colonoscopy (2016), Essential  "hypertension, Essential tremor, Excessive weight loss, Fracture of thoracic spine, Frequent urination, Ganglion, right wrist, GERD (gastroesophageal reflux disease), HH (hiatus hernia) (9/1/2021), History of bone density study (10/26/2020), History of esophagogastroduodenoscopy (EGD) (06/23/2016), History of mammography, screening (03/29/2017), Hyperlipemia, Incomplete emptying of bladder, Insomnia, Multiple joint pain, Osteoarthritis, Osteopenia, Pain, lumbar region, Pap smear for cervical cancer screening (03/2010), Scoliosis, Screening for colon cancer (6/13/2022), Stomach cramps, Subconjunctival hemorrhage, and Vitamin D deficiency.   Past Surgical History:  has a past surgical history that includes Tonsillectomy; Adenoidectomy; suspensiion of uterus (1965); Hysterectomy; Cervix lesion destruction; Cholecystectomy; Vertebroplasty (06/06/2013); and Breast biopsy.  Family History: family history includes Breast cancer in her maternal aunt, maternal aunt, and sister; Cancer in her sister; Dementia in her mother; Diabetes in her mother; Heart disease in her mother; Hypertension in her mother.  Social History:  reports that she has never smoked. She has been exposed to tobacco smoke. She has never used smokeless tobacco. She reports current alcohol use of about 1.0 standard drink of alcohol per week. She reports that she does not use drugs.  Allergies:   Review of patient's allergies indicates:   Allergen Reactions    Augmentin [amoxicillin-pot clavulanate]     Macrobid [nitrofurantoin monohyd/m-cryst]     Sulfa (sulfonamide antibiotics)        Current Medications[1]   OBJECTIVE:     Vital Signs   /69 (BP Location: Left arm, Patient Position: Sitting)   Pulse 90   Temp 98.3 °F (36.8 °C) (Oral)   Resp 18   Ht 5' 2" (1.575 m)   Wt 58.4 kg (128 lb 12.8 oz)   SpO2 95%   BMI 23.56 kg/m²     Physical Exam  Constitutional:       General: She is not in acute distress.     Appearance: Normal appearance. She is " not ill-appearing, toxic-appearing or diaphoretic.   HENT:      Head: Normocephalic and atraumatic.      Right Ear: Tympanic membrane normal.      Left Ear: Tympanic membrane normal.      Nose: Congestion present. No rhinorrhea.      Mouth/Throat:      Mouth: Mucous membranes are moist.      Pharynx: No oropharyngeal exudate or posterior oropharyngeal erythema.   Eyes:      Extraocular Movements: Extraocular movements intact.      Pupils: Pupils are equal, round, and reactive to light.   Cardiovascular:      Rate and Rhythm: Normal rate and regular rhythm.      Pulses: Normal pulses.      Heart sounds: Normal heart sounds. No murmur heard.     No friction rub. No gallop.   Pulmonary:      Effort: Pulmonary effort is normal. No respiratory distress.      Breath sounds: Rales (Faint left lower lobe crackles) present. No wheezing or rhonchi.   Abdominal:      General: Abdomen is flat.      Palpations: Abdomen is soft.      Tenderness: There is no abdominal tenderness. There is no guarding or rebound.   Musculoskeletal:         General: Normal range of motion.      Cervical back: Normal range of motion.   Skin:     General: Skin is warm and dry.      Capillary Refill: Capillary refill takes less than 2 seconds.   Neurological:      General: No focal deficit present.      Mental Status: She is alert.   Psychiatric:         Mood and Affect: Mood normal.         Behavior: Behavior normal.         ASSESSMENT/PLAN:     1. Community acquired pneumonia of left lower lobe of lung  -     cefpodoxime (VANTIN) 200 MG tablet; Take 1 tablet (200 mg total) by mouth 2 (two) times daily. for 7 days  Dispense: 14 tablet; Refill: 0    2. Cough, unspecified type  -     POCT COVID-19 Rapid Screening  -     POCT Influenza A/B Molecular  -     X-Ray Chest PA And Lateral; Future; Expected date: 04/24/2025  -     benzonatate (TESSALON) 100 MG capsule; Take 1 capsule (100 mg total) by mouth 3 (three) times daily as needed for Cough.  Dispense:  30 capsule; Refill: 0  -     doxycycline (VIBRAMYCIN) 100 MG Cap; Take 1 capsule (100 mg total) by mouth every 12 (twelve) hours. for 10 days  Dispense: 20 capsule; Refill: 0  -     cetirizine (ZYRTEC) 10 MG tablet; Take 1 tablet (10 mg total) by mouth once daily.  Dispense: 30 tablet; Refill: 0  -     fluticasone propionate (FLONASE) 50 mcg/actuation nasal spray; 1 spray (50 mcg total) by Each Nostril route once daily.  Dispense: 18.2 mL; Refill: 0    3. Sore throat  -     POCT Strep A, Molecular    4. Solitary pulmonary nodule  -     Cancel: CT Chest Without Contrast; Future; Expected date: 04/25/2025  -     CT Chest Without Contrast; Future; Expected date: 04/25/2025    Patient presents with URI symptoms.  Patient reports some chest congestion.  Possible rales noted in left lower lobe.  X-ray chest ordered for additional evaluation.  X-ray consistent with community-acquired pneumonia.  Treating with cefpodoxime and doxycycline.  Also provided prescriptions for Tessalon Perles, Zyrtec, and Flonase.  Emergency precautions discussed in detail.  Patient verbalized understanding.    Of note, chest x-ray also noted to have pulmonary nodule.  CT chest ordered.  Patient requesting CT to be performed at [facility].  Order placed and scan scheduled by nursing staff.      No follow-ups on file.      JOSÉ STEVE MD  04/25/2025    Due to voice recognition software, sound alike and misspelled words may be contained in the documentation.              [1]   Current Outpatient Medications:     amLODIPine (NORVASC) 10 MG tablet, Take 1 tablet (10 mg total) by mouth every evening., Disp: 90 tablet, Rfl: 3    B-complex with vitamin C (Z-BEC OR EQUIV) tablet, Take 1 tablet by mouth once daily., Disp: , Rfl:     cholecalciferol, vitamin D3, 125 mcg (5,000 unit) capsule, Take 5,000 Units by mouth once daily., Disp: , Rfl:     ivermectin (STROMECTOL) 3 mg Tab, Take 3 mg by mouth once., Disp: , Rfl:     omeprazole (PRILOSEC) 20  MG capsule, TAKE 1 CAPSULE BY MOUTH EVERY DAY on AN empty stomach, Disp: 90 capsule, Rfl: 3    pantoprazole (PROTONIX) 40 MG tablet, Take 1 tablet (40 mg total) by mouth once daily., Disp: 90 tablet, Rfl: 3    potassium chloride (MICRO-K) 10 MEQ CpSR, Take 1 capsule (10 mEq total) by mouth 2 (two) times daily., Disp: 180 capsule, Rfl: 3    propranoloL (INDERAL) 40 MG tablet, Take 1 tablet (40 mg total) by mouth 2 (two) times daily., Disp: 180 tablet, Rfl: 3    vit C,A-Cr-sbtev-lutein-zeaxan (PRESERVISION AREDS-2) 250-90-40-1 mg Cap, Take by mouth., Disp: , Rfl:     benzonatate (TESSALON) 100 MG capsule, Take 1 capsule (100 mg total) by mouth 3 (three) times daily as needed for Cough., Disp: 30 capsule, Rfl: 0    cefpodoxime (VANTIN) 200 MG tablet, Take 1 tablet (200 mg total) by mouth 2 (two) times daily. for 7 days, Disp: 14 tablet, Rfl: 0    cetirizine (ZYRTEC) 10 MG tablet, Take 1 tablet (10 mg total) by mouth once daily., Disp: 30 tablet, Rfl: 0    doxycycline (VIBRAMYCIN) 100 MG Cap, Take 1 capsule (100 mg total) by mouth every 12 (twelve) hours. for 10 days, Disp: 20 capsule, Rfl: 0    fluticasone propionate (FLONASE) 50 mcg/actuation nasal spray, 1 spray (50 mcg total) by Each Nostril route once daily., Disp: 18.2 mL, Rfl: 0

## 2025-04-25 ENCOUNTER — RESULTS FOLLOW-UP (OUTPATIENT)
Dept: FAMILY MEDICINE | Facility: CLINIC | Age: 80
End: 2025-04-25
Payer: MEDICARE

## 2025-04-25 RX ORDER — CEFPODOXIME PROXETIL 200 MG/1
200 TABLET, FILM COATED ORAL 2 TIMES DAILY
Qty: 14 TABLET | Refills: 0 | Status: SHIPPED | OUTPATIENT
Start: 2025-04-25 | End: 2025-05-02

## 2025-05-07 ENCOUNTER — INFUSION (OUTPATIENT)
Dept: INFUSION THERAPY | Facility: HOSPITAL | Age: 80
End: 2025-05-07
Attending: NURSE PRACTITIONER
Payer: MEDICARE

## 2025-05-07 DIAGNOSIS — M81.0 OSTEOPOROSIS, UNSPECIFIED OSTEOPOROSIS TYPE, UNSPECIFIED PATHOLOGICAL FRACTURE PRESENCE: Primary | ICD-10-CM

## 2025-05-07 DIAGNOSIS — M81.0 AGE-RELATED OSTEOPOROSIS WITHOUT CURRENT PATHOLOGICAL FRACTURE: Primary | ICD-10-CM

## 2025-05-07 LAB — CALCIUM SERPL-MCNC: 9 MG/DL (ref 8.4–10.2)

## 2025-05-07 PROCEDURE — 36415 COLL VENOUS BLD VENIPUNCTURE: CPT | Performed by: FAMILY MEDICINE

## 2025-05-07 PROCEDURE — 63600175 PHARM REV CODE 636 W HCPCS: Mod: JZ,TB | Performed by: FAMILY MEDICINE

## 2025-05-07 PROCEDURE — 82310 ASSAY OF CALCIUM: CPT | Performed by: FAMILY MEDICINE

## 2025-05-07 PROCEDURE — 96372 THER/PROPH/DIAG INJ SC/IM: CPT

## 2025-05-07 RX ADMIN — DENOSUMAB 60 MG: 60 INJECTION SUBCUTANEOUS at 02:05

## 2025-05-07 NOTE — PROGRESS NOTES
1335 Pt in room 1.      1410 Calcium level checked today was 9.0.  Pt stated that she is taking her Calcium with Vitamin D.  Administered Prolia 60mg SQ to left upper arm per protocol.  Pt tolerated well.    1430 No adverse reaction noted.  D/C home, ambulatory with appointment in hand to return in 6 months for next Prolia injection.

## 2025-05-09 DIAGNOSIS — I10 PRIMARY HYPERTENSION: ICD-10-CM

## 2025-05-09 DIAGNOSIS — R91.1 PULMONARY NODULE: ICD-10-CM

## 2025-05-09 DIAGNOSIS — E04.1 THYROID NODULE: Primary | ICD-10-CM

## 2025-05-09 DIAGNOSIS — R91.8 GROUND GLASS OPACITY PRESENT ON IMAGING OF LUNG: ICD-10-CM

## 2025-05-09 RX ORDER — AMLODIPINE BESYLATE 10 MG/1
10 TABLET ORAL NIGHTLY
Qty: 90 TABLET | Refills: 0 | Status: SHIPPED | OUTPATIENT
Start: 2025-05-09

## 2025-05-09 NOTE — PROGRESS NOTES
Patient had CT scan performed at [facility].  Results were not forwarded to our clinic.  However, patient called to get results.  Results reviewed.  Patient noted to have thyroid nodule.  Patient amenable to having thyroid ultrasound performed.  Thyroid ultrasound ordered.  Obtaining TSH, T4 and T3 as well.  Patient also noted to have ground-glass nodular densities in the left lower lobe.  Patient was recently treated for pneumonia after chest x-ray showed possible left lower lobe pneumonia.  Patient notes that she has completed her course of antibiotics in her symptoms have essentially resolved.  Given imaging findings, we will go ahead and refer to pulmonology for additional evaluation.  Appreciate assistance.

## 2025-05-12 ENCOUNTER — HOSPITAL ENCOUNTER (OUTPATIENT)
Dept: RADIOLOGY | Facility: HOSPITAL | Age: 80
Discharge: HOME | End: 2025-05-12
Attending: STUDENT IN AN ORGANIZED HEALTH CARE EDUCATION/TRAINING PROGRAM
Payer: MEDICARE

## 2025-05-12 DIAGNOSIS — E04.1 THYROID NODULE: ICD-10-CM

## 2025-05-12 PROCEDURE — 76536 US EXAM OF HEAD AND NECK: CPT | Mod: 26,,, | Performed by: RADIOLOGY

## 2025-05-12 PROCEDURE — 76536 US EXAM OF HEAD AND NECK: CPT | Mod: TC

## 2025-05-13 ENCOUNTER — RESULTS FOLLOW-UP (OUTPATIENT)
Dept: FAMILY MEDICINE | Facility: CLINIC | Age: 80
End: 2025-05-13

## 2025-05-13 DIAGNOSIS — E04.2 MULTIPLE THYROID NODULES: Primary | ICD-10-CM

## 2025-05-13 NOTE — PROGRESS NOTES
Discussed results with PT she would like a referral placed for [facility] Patient voiced understanding with no questions

## 2025-05-13 NOTE — PROGRESS NOTES
Please let patient know that she has several large thyroid nodules. Radiology recommends a thyroid uptake scan. I would like to refer her to Endocrinology to have them evaluate further. If she is amenable, please let me know, and I will place the referral.

## 2025-06-09 ENCOUNTER — OFFICE VISIT (OUTPATIENT)
Dept: PULMONOLOGY | Facility: CLINIC | Age: 80
End: 2025-06-09
Payer: MEDICARE

## 2025-06-09 VITALS
HEIGHT: 62 IN | OXYGEN SATURATION: 99 % | RESPIRATION RATE: 16 BRPM | WEIGHT: 127.88 LBS | DIASTOLIC BLOOD PRESSURE: 62 MMHG | BODY MASS INDEX: 23.53 KG/M2 | SYSTOLIC BLOOD PRESSURE: 102 MMHG | HEART RATE: 75 BPM

## 2025-06-09 DIAGNOSIS — R91.1 PULMONARY NODULE: ICD-10-CM

## 2025-06-09 DIAGNOSIS — R91.8 GROUND GLASS OPACITY PRESENT ON IMAGING OF LUNG: ICD-10-CM

## 2025-06-09 DIAGNOSIS — J43.2 CENTRILOBULAR EMPHYSEMA: Primary | ICD-10-CM

## 2025-06-09 PROBLEM — Z11.59 SCREENING FOR VIRAL DISEASE: Status: RESOLVED | Noted: 2022-05-10 | Resolved: 2025-06-09

## 2025-06-09 PROBLEM — R19.7 DIARRHEA: Status: RESOLVED | Noted: 2021-07-20 | Resolved: 2025-06-09

## 2025-06-09 PROBLEM — R11.2 NAUSEA AND VOMITING: Status: RESOLVED | Noted: 2021-07-20 | Resolved: 2025-06-09

## 2025-06-09 PROBLEM — K29.00 ACUTE SUPERFICIAL GASTRITIS WITHOUT HEMORRHAGE: Status: RESOLVED | Noted: 2021-09-01 | Resolved: 2025-06-09

## 2025-06-09 PROBLEM — U07.1 COVID-19: Status: RESOLVED | Noted: 2022-02-07 | Resolved: 2025-06-09

## 2025-06-09 PROCEDURE — 99215 OFFICE O/P EST HI 40 MIN: CPT | Mod: PBBFAC | Performed by: STUDENT IN AN ORGANIZED HEALTH CARE EDUCATION/TRAINING PROGRAM

## 2025-06-09 PROCEDURE — 99999 PR PBB SHADOW E&M-EST. PATIENT-LVL V: CPT | Mod: PBBFAC,,, | Performed by: STUDENT IN AN ORGANIZED HEALTH CARE EDUCATION/TRAINING PROGRAM

## 2025-06-09 PROCEDURE — 99204 OFFICE O/P NEW MOD 45 MIN: CPT | Mod: S$PBB,,, | Performed by: STUDENT IN AN ORGANIZED HEALTH CARE EDUCATION/TRAINING PROGRAM

## 2025-06-09 RX ORDER — COLESTIPOL HYDROCHLORIDE 1 G/1
TABLET ORAL
COMMUNITY
Start: 2025-05-20

## 2025-06-09 RX ORDER — CALCIUM POLYCARBOPHIL 625 MG
1 TABLET ORAL
COMMUNITY

## 2025-06-09 RX ORDER — BUTYROSPERMUM PARKII(SHEA BUTTER), SIMMONDSIA CHINENSIS (JOJOBA) SEED OIL, ALOE BARBADENSIS LEAF EXTRACT .01; 1; 3.5 G/100G; G/100G; G/100G
LIQUID TOPICAL
COMMUNITY

## 2025-06-09 RX ORDER — IPRATROPIUM BROMIDE 0.5 MG/2.5ML
500 SOLUTION RESPIRATORY (INHALATION) EVERY 6 HOURS PRN
Qty: 300 ML | Refills: 6 | Status: SHIPPED | OUTPATIENT
Start: 2025-06-09 | End: 2026-06-09

## 2025-06-09 RX ORDER — TRIAMTERENE AND HYDROCHLOROTHIAZIDE 37.5; 25 MG/1; MG/1
TABLET ORAL
COMMUNITY

## 2025-06-09 NOTE — PROGRESS NOTES
[facility]  Pulmonology  NEW VISIT     Patient Name:  Masoud Major  Primary Care Provider: Charley Holm MD  Date of Service: 6/9/2025   Reason for Referral:   R91.1 (ICD-10-CM) - Pulmonary nodule   R91.8 (ICD-10-CM) - Ground glass opacity present on imaging of magaly     Chief Complaint: abnormal CT    SUBJECTIVE   HPI:  Masoud Major is a 80 y.o. female with HTN who presents today upon referral with complaints of abnormal lung imaging.     Ms. Major presents for evaluation of a spot on her lung detected on imaging. Ms. Major reports feeling generally well, with an occasional deep cough for an extended period. She denies sputum production and shortness of breath. She quit smoking in 2019 following her 's lung cancer diagnosis. Ms. Major reports a history of bronchitis and pneumonia in the left lung. She had COVID-19 during the initial wave, with mild symptoms for about 2 days without fever. We reviewed her CT Chest imaging.    SOCIAL HISTORY:  Smoking: Quit in 2019             Past Medical History:   Diagnosis Date    Abnormal weight loss     Acute bronchitis     Acute superficial gastritis without hemorrhage 9/1/2021    Altered bowel function     Anxiety     Back pain     Belching symptom     Benign hypertension     Cobalamin deficiency     Colon, diverticulosis 6/13/2022    Constipation     Contact dermatitis     Depression     Diarrhea     Disorder of muscle, ligament, and fascia     Dysphagia     Elevated white blood cell count     Encounter for screening colonoscopy 03/22/2016    Essential hypertension     Essential tremor     Excessive weight loss     Fracture of thoracic spine     Frequent urination     Ganglion, right wrist     GERD (gastroesophageal reflux disease)     HH (hiatus hernia) 9/1/2021    History of bone density study 10/26/2020    History of esophagogastroduodenoscopy (EGD) 06/23/2016    History of mammography, screening 03/29/2017    Hyperlipemia     Incomplete  What Type Of Note Output Would You Prefer (Optional)?: Bullet Format "emptying of bladder     Insomnia     Multiple joint pain     Osteoarthritis     Osteopenia     Pain, lumbar region     Pap smear for cervical cancer screening 03/2010    Scoliosis     Screening for colon cancer 6/13/2022    Stomach cramps     Subconjunctival hemorrhage     Vitamin D deficiency        Past Surgical History:   Procedure Laterality Date    ADENOIDECTOMY      BREAST BIOPSY      CERVIX LESION DESTRUCTION      CHOLECYSTECTOMY      HYSTERECTOMY      suspensiion of uterus  1965    TONSILLECTOMY      VERTEBROPLASTY  06/06/2013    Dr. alejandro       Family History   Problem Relation Name Age of Onset    Dementia Mother      Heart disease Mother      Hypertension Mother      Diabetes Mother      Cancer Sister      Breast cancer Sister      Breast cancer Maternal Aunt      Breast cancer Maternal Aunt          Social History[1]    Social History     Social History Narrative    Not on file       Review of patient's allergies indicates:   Allergen Reactions    Augmentin [amoxicillin-pot clavulanate]     Gabapentin      Other Reaction(s): OUT OF HEAD    Macrobid [nitrofurantoin monohyd/m-cryst]     Sulfa (sulfonamide antibiotics)      Other Reaction(s): HIVES        Medications: Medications reviewed to include over the counter medications.    Review of Systems: A focused ROS was completed and found to be negative except for that mentioned above.      OBJECTIVE   PHYSICAL EXAM:  Vitals:    06/09/25 1414   BP: 102/62   BP Location: Left arm   Patient Position: Sitting   Pulse: 75   Resp: 16   SpO2: 99%   Weight: 58 kg (127 lb 13.9 oz)   Height: 5' 2" (1.575 m)        GENERAL: NAD  HEENT: normocephalic, non-icteric conjunctivae, moist oral mucosa  RESPIRATORY: clear to auscultation, no wheezing, rales or rhonchi, dry cough, on RA  CARDIOVASCULAR: Regular rate and rhythm, no murmurs rubs or gallops  MUSCULOSKELETAL: No clubbing or cyanosis  NEUROLOGIC: AO ×3, no gross deficits    LABS:  Lab studies reviewed and notable for " Hpi Title: Evaluation of Skin Lesions H/H 12.4/35.4, SEos 130; peak 210, CO2 27, SCr 0.8 (08/2023)    IMAGING:  Imaging reviewed and notable for CT A&P 2020 with no L basilar bronchiectatic changes, no nodules. CT chest 05/2025 with mild centrilobular emphysema in bilateral upper lobes, paraseptal emphysema present, left lower lobe bronchiectatic airway with terminal nodule present in coronal view, axial views with chronic bronchitis    LUNG FUNCTION TESTING: None available to review or report    ASSESSMENT & PLAN     IMPRESSION:  Assessed lung spot on CT, favoring inflammation over malignancy due to associated airway inflammation.  Noted emphysema in upper lung fields, likely stable due to smoking cessation.  Evaluated cough, potentially related to airway inflammation.    LUNG ABNORMALITY:  - Explained CT findings, including the nature of the lung spot and its likely inflammatory origin.  - Ordered follow-up CT in 5-6 months to monitor lung spot.    EMPHYSEMA:  - Educated on the presence of minimal emphysema and expected non-progressive nature after smoking cessation.    PNEUMONIA:  - Informed about the typical timeline for pneumonia resolution, noting it can take up to 3 months for complete radiographic clearance.    BRONCHITIS:  - Started ipratropium nebulizer treatment (without albuterol) to open airways, clear phlegm, and manage airway inflammation and cough.    FOLLOW-UP:  - Follow up in 5-6 months for CT and to review results.            1. Centrilobular emphysema  -     ipratropium (ATROVENT) 0.02 % nebulizer solution; Take 2.5 mLs (500 mcg total) by nebulization every 6 (six) hours as needed for Wheezing (Coughing). Rescue  Dispense: 300 mL; Refill: 6    2. Pulmonary nodule  -     Ambulatory referral/consult to Pulmonology  -     CT Chest Without Contrast; Future; Expected date: 11/11/2025    3. Ground glass opacity present on imaging of lung  -     Ambulatory referral/consult to Pulmonology  -     CT Chest Without Contrast; Future; Expected  How Severe Are Your Spot(S)?: mild Have Your Spot(S) Been Treated In The Past?: has not been treated date: 2025       This note was generated with the assistance of ambient listening technology. Verbal consent was obtained by the patient and accompanying visitor(s) for the recording of patient appointment to facilitate this note. I attest to having reviewed and edited the generated note for accuracy, though some syntax or spelling errors may persist. Please contact the author of this note for any clarification.         Follow up in about 6 months (around 2025) for Routine follow up.    Case was discussed with patient; all questions were answered to patient's satisfaction and patient verbalized understanding.     Josephine Stubbs MD  Pulmonary Medicine  [facility]  Phone: [phone]          [1]   Social History  Socioeconomic History    Marital status:    Occupational History    Occupation: retired   Tobacco Use    Smoking status: Former     Current packs/day: 0.00     Average packs/day: 0.5 packs/day for 56.0 years (28.0 ttl pk-yrs)     Types: Cigarettes     Start date: 1963     Quit date: 2019     Years since quittin.4     Passive exposure: Past    Smokeless tobacco: Never   Substance and Sexual Activity    Alcohol use: Yes     Alcohol/week: 1.0 standard drink of alcohol     Types: 1 Glasses of wine per week     Comment: social    Drug use: Never    Sexual activity: Not Currently     Social Drivers of Health     Financial Resource Strain: Low Risk  (2024)    Overall Financial Resource Strain (CARDIA)     Difficulty of Paying Living Expenses: Not very hard   Food Insecurity: No Food Insecurity (2024)    Hunger Vital Sign     Worried About Running Out of Food in the Last Year: Never true     Ran Out of Food in the Last Year: Never true   Physical Activity: Unknown (2024)    Exercise Vital Sign     Days of Exercise per Week: 5 days   Stress: Stress Concern Present (2024)    [employer] [place] [employer] - Occupational Stress Questionnaire      Feeling of Stress : To some extent   Housing Stability: Unknown (6/20/2024)    Housing Stability Vital Sign     Unable to Pay for Housing in the Last Year: No

## 2025-09-03 DIAGNOSIS — I10 PRIMARY HYPERTENSION: ICD-10-CM

## 2025-09-03 RX ORDER — AMLODIPINE BESYLATE 10 MG/1
10 TABLET ORAL NIGHTLY
Qty: 90 TABLET | Refills: 0 | Status: SHIPPED | OUTPATIENT
Start: 2025-09-03